# Patient Record
Sex: FEMALE | Race: WHITE | NOT HISPANIC OR LATINO | Employment: OTHER | URBAN - METROPOLITAN AREA
[De-identification: names, ages, dates, MRNs, and addresses within clinical notes are randomized per-mention and may not be internally consistent; named-entity substitution may affect disease eponyms.]

---

## 2018-07-10 ENCOUNTER — OFFICE VISIT CONVERTED (OUTPATIENT)
Dept: FAMILY MEDICINE CLINIC | Facility: CLINIC | Age: 68
End: 2018-07-10
Attending: FAMILY MEDICINE

## 2018-08-13 ENCOUNTER — OFFICE VISIT CONVERTED (OUTPATIENT)
Dept: FAMILY MEDICINE CLINIC | Facility: CLINIC | Age: 68
End: 2018-08-13
Attending: FAMILY MEDICINE

## 2019-02-14 ENCOUNTER — CONVERSION ENCOUNTER (OUTPATIENT)
Dept: FAMILY MEDICINE CLINIC | Facility: CLINIC | Age: 69
End: 2019-02-14

## 2019-02-14 ENCOUNTER — OFFICE VISIT CONVERTED (OUTPATIENT)
Dept: FAMILY MEDICINE CLINIC | Facility: CLINIC | Age: 69
End: 2019-02-14
Attending: FAMILY MEDICINE

## 2019-02-14 ENCOUNTER — HOSPITAL ENCOUNTER (OUTPATIENT)
Dept: FAMILY MEDICINE CLINIC | Facility: CLINIC | Age: 69
Discharge: HOME | End: 2019-02-14
Attending: FAMILY MEDICINE

## 2019-02-18 LAB
CONV RHEUMATOID FACTOR IGA: 2 UNITS (ref 0–6)
CONV RHEUMATOID FACTOR IGG: 2 UNITS (ref 0–6)
CONV RHEUMATOID FACTOR IGM: 1 UNITS (ref 0–6)

## 2019-03-29 ENCOUNTER — OFFICE VISIT CONVERTED (OUTPATIENT)
Dept: FAMILY MEDICINE CLINIC | Facility: CLINIC | Age: 69
End: 2019-03-29
Attending: FAMILY MEDICINE

## 2019-04-08 ENCOUNTER — HOSPITAL ENCOUNTER (OUTPATIENT)
Dept: CARDIOLOGY | Facility: HOSPITAL | Age: 69
Discharge: HOME | End: 2019-04-08
Attending: FAMILY MEDICINE

## 2019-04-12 ENCOUNTER — HOSPITAL ENCOUNTER (OUTPATIENT)
Dept: URGENT CARE | Facility: CLINIC | Age: 69
Discharge: HOME | End: 2019-04-12
Attending: FAMILY MEDICINE

## 2019-08-04 ENCOUNTER — HOSPITAL ENCOUNTER (OUTPATIENT)
Dept: URGENT CARE | Facility: CLINIC | Age: 69
Discharge: HOME | End: 2019-08-04
Attending: FAMILY MEDICINE

## 2019-08-14 ENCOUNTER — HOSPITAL ENCOUNTER (OUTPATIENT)
Dept: URGENT CARE | Facility: CLINIC | Age: 69
Discharge: HOME | End: 2019-08-14
Attending: EMERGENCY MEDICINE

## 2019-10-01 ENCOUNTER — HOSPITAL ENCOUNTER (OUTPATIENT)
Dept: FAMILY MEDICINE CLINIC | Facility: CLINIC | Age: 69
Discharge: HOME | End: 2019-10-01
Attending: FAMILY MEDICINE

## 2019-10-01 ENCOUNTER — OFFICE VISIT CONVERTED (OUTPATIENT)
Dept: FAMILY MEDICINE CLINIC | Facility: CLINIC | Age: 69
End: 2019-10-01
Attending: FAMILY MEDICINE

## 2019-10-01 LAB
ALBUMIN SERPL-MCNC: 4.1 G/DL (ref 3.5–5)
ALBUMIN/GLOB SERPL: 1.6 {RATIO} (ref 1.4–2.6)
ALP SERPL-CCNC: 61 U/L (ref 43–160)
ALT SERPL-CCNC: 12 U/L (ref 10–40)
ANION GAP SERPL CALC-SCNC: 19 MMOL/L (ref 8–19)
AST SERPL-CCNC: 20 U/L (ref 15–50)
BASOPHILS # BLD AUTO: 0.03 10*3/UL (ref 0–0.2)
BASOPHILS NFR BLD AUTO: 0.5 % (ref 0–3)
BILIRUB SERPL-MCNC: 0.44 MG/DL (ref 0.2–1.3)
BUN SERPL-MCNC: 16 MG/DL (ref 5–25)
BUN/CREAT SERPL: 18 {RATIO} (ref 6–20)
CALCIUM SERPL-MCNC: 9.4 MG/DL (ref 8.7–10.4)
CHLORIDE SERPL-SCNC: 102 MMOL/L (ref 99–111)
CONV ABS IMM GRAN: 0.01 10*3/UL (ref 0–0.2)
CONV CO2: 24 MMOL/L (ref 22–32)
CONV IMMATURE GRAN: 0.2 % (ref 0–1.8)
CONV TOTAL PROTEIN: 6.7 G/DL (ref 6.3–8.2)
CREAT UR-MCNC: 0.89 MG/DL (ref 0.5–0.9)
DEPRECATED RDW RBC AUTO: 49.8 FL (ref 36.4–46.3)
EOSINOPHIL # BLD AUTO: 0.08 10*3/UL (ref 0–0.7)
EOSINOPHIL # BLD AUTO: 1.4 % (ref 0–7)
ERYTHROCYTE [DISTWIDTH] IN BLOOD BY AUTOMATED COUNT: 13 % (ref 11.7–14.4)
GFR SERPLBLD BASED ON 1.73 SQ M-ARVRAT: >60 ML/MIN/{1.73_M2}
GLOBULIN UR ELPH-MCNC: 2.6 G/DL (ref 2–3.5)
GLUCOSE SERPL-MCNC: 77 MG/DL (ref 65–99)
HCT VFR BLD AUTO: 37.4 % (ref 37–47)
HGB BLD-MCNC: 11.4 G/DL (ref 12–16)
LYMPHOCYTES # BLD AUTO: 2.25 10*3/UL (ref 1–5)
LYMPHOCYTES NFR BLD AUTO: 39.1 % (ref 20–45)
MCH RBC QN AUTO: 31.8 PG (ref 27–31)
MCHC RBC AUTO-ENTMCNC: 30.5 G/DL (ref 33–37)
MCV RBC AUTO: 104.2 FL (ref 81–99)
MONOCYTES # BLD AUTO: 0.41 10*3/UL (ref 0.2–1.2)
MONOCYTES NFR BLD AUTO: 7.1 % (ref 3–10)
NEUTROPHILS # BLD AUTO: 2.98 10*3/UL (ref 2–8)
NEUTROPHILS NFR BLD AUTO: 51.7 % (ref 30–85)
NRBC CBCN: 0 % (ref 0–0.7)
OSMOLALITY SERPL CALC.SUM OF ELEC: 290 MOSM/KG (ref 273–304)
PLATELET # BLD AUTO: 217 10*3/UL (ref 130–400)
PMV BLD AUTO: 11.7 FL (ref 9.4–12.3)
POTASSIUM SERPL-SCNC: 4.5 MMOL/L (ref 3.5–5.3)
RBC # BLD AUTO: 3.59 10*6/UL (ref 4.2–5.4)
SODIUM SERPL-SCNC: 140 MMOL/L (ref 135–147)
WBC # BLD AUTO: 5.76 10*3/UL (ref 4.8–10.8)

## 2020-01-04 ENCOUNTER — HOSPITAL ENCOUNTER (OUTPATIENT)
Dept: URGENT CARE | Facility: CLINIC | Age: 70
Discharge: HOME | End: 2020-01-04

## 2020-01-06 LAB — BACTERIA UR CULT: NORMAL

## 2020-01-20 ENCOUNTER — HOSPITAL ENCOUNTER (OUTPATIENT)
Dept: URGENT CARE | Facility: CLINIC | Age: 70
Discharge: HOME | End: 2020-01-20

## 2020-01-22 LAB — BACTERIA SPEC AEROBE CULT: NORMAL

## 2020-04-02 ENCOUNTER — TELEMEDICINE CONVERTED (OUTPATIENT)
Dept: FAMILY MEDICINE CLINIC | Facility: CLINIC | Age: 70
End: 2020-04-02
Attending: FAMILY MEDICINE

## 2020-07-02 ENCOUNTER — OFFICE VISIT CONVERTED (OUTPATIENT)
Dept: FAMILY MEDICINE CLINIC | Facility: CLINIC | Age: 70
End: 2020-07-02
Attending: NURSE PRACTITIONER

## 2020-07-02 ENCOUNTER — HOSPITAL ENCOUNTER (OUTPATIENT)
Dept: FAMILY MEDICINE CLINIC | Facility: CLINIC | Age: 70
Discharge: HOME | End: 2020-07-02
Attending: NURSE PRACTITIONER

## 2020-07-02 LAB
ALBUMIN SERPL-MCNC: 3.9 G/DL (ref 3.5–5)
ALBUMIN/GLOB SERPL: 1.6 {RATIO} (ref 1.4–2.6)
ALP SERPL-CCNC: 66 U/L (ref 43–160)
ALT SERPL-CCNC: 16 U/L (ref 10–40)
ANION GAP SERPL CALC-SCNC: 19 MMOL/L (ref 8–19)
AST SERPL-CCNC: 23 U/L (ref 15–50)
BASOPHILS # BLD AUTO: 0.04 10*3/UL (ref 0–0.2)
BASOPHILS NFR BLD AUTO: 0.7 % (ref 0–3)
BILIRUB SERPL-MCNC: 0.58 MG/DL (ref 0.2–1.3)
BUN SERPL-MCNC: 15 MG/DL (ref 5–25)
BUN/CREAT SERPL: 19 {RATIO} (ref 6–20)
CALCIUM SERPL-MCNC: 9.3 MG/DL (ref 8.7–10.4)
CHLORIDE SERPL-SCNC: 102 MMOL/L (ref 99–111)
CONV ABS IMM GRAN: 0.01 10*3/UL (ref 0–0.2)
CONV CO2: 24 MMOL/L (ref 22–32)
CONV IMMATURE GRAN: 0.2 % (ref 0–1.8)
CONV TOTAL PROTEIN: 6.4 G/DL (ref 6.3–8.2)
CREAT UR-MCNC: 0.79 MG/DL (ref 0.5–0.9)
DEPRECATED RDW RBC AUTO: 48.6 FL (ref 36.4–46.3)
EOSINOPHIL # BLD AUTO: 0.08 10*3/UL (ref 0–0.7)
EOSINOPHIL # BLD AUTO: 1.4 % (ref 0–7)
ERYTHROCYTE [DISTWIDTH] IN BLOOD BY AUTOMATED COUNT: 12.7 % (ref 11.7–14.4)
GFR SERPLBLD BASED ON 1.73 SQ M-ARVRAT: >60 ML/MIN/{1.73_M2}
GLOBULIN UR ELPH-MCNC: 2.5 G/DL (ref 2–3.5)
GLUCOSE SERPL-MCNC: 89 MG/DL (ref 65–99)
HCT VFR BLD AUTO: 37.6 % (ref 37–47)
HGB BLD-MCNC: 11.6 G/DL (ref 12–16)
INR PPP: 0.91 (ref 2–3)
LYMPHOCYTES # BLD AUTO: 1.82 10*3/UL (ref 1–5)
LYMPHOCYTES NFR BLD AUTO: 32.3 % (ref 20–45)
MCH RBC QN AUTO: 32 PG (ref 27–31)
MCHC RBC AUTO-ENTMCNC: 30.9 G/DL (ref 33–37)
MCV RBC AUTO: 103.6 FL (ref 81–99)
MONOCYTES # BLD AUTO: 0.39 10*3/UL (ref 0.2–1.2)
MONOCYTES NFR BLD AUTO: 6.9 % (ref 3–10)
NEUTROPHILS # BLD AUTO: 3.3 10*3/UL (ref 2–8)
NEUTROPHILS NFR BLD AUTO: 58.5 % (ref 30–85)
NRBC CBCN: 0 % (ref 0–0.7)
OSMOLALITY SERPL CALC.SUM OF ELEC: 290 MOSM/KG (ref 273–304)
PLATELET # BLD AUTO: 214 10*3/UL (ref 130–400)
PMV BLD AUTO: 11.4 FL (ref 9.4–12.3)
POTASSIUM SERPL-SCNC: 5.1 MMOL/L (ref 3.5–5.3)
PROTHROMBIN TIME: 10 S (ref 9.4–12)
RBC # BLD AUTO: 3.63 10*6/UL (ref 4.2–5.4)
SODIUM SERPL-SCNC: 140 MMOL/L (ref 135–147)
WBC # BLD AUTO: 5.64 10*3/UL (ref 4.8–10.8)

## 2020-08-21 ENCOUNTER — OFFICE VISIT CONVERTED (OUTPATIENT)
Dept: FAMILY MEDICINE CLINIC | Facility: CLINIC | Age: 70
End: 2020-08-21
Attending: FAMILY MEDICINE

## 2020-11-17 ENCOUNTER — HOSPITAL ENCOUNTER (OUTPATIENT)
Dept: MAMMOGRAPHY | Facility: HOSPITAL | Age: 70
Discharge: HOME | End: 2020-11-17
Attending: FAMILY MEDICINE

## 2020-11-24 ENCOUNTER — TELEPHONE CONVERTED (OUTPATIENT)
Dept: FAMILY MEDICINE CLINIC | Facility: CLINIC | Age: 70
End: 2020-11-24
Attending: FAMILY MEDICINE

## 2021-03-09 ENCOUNTER — HOSPITAL ENCOUNTER (OUTPATIENT)
Dept: FAMILY MEDICINE CLINIC | Facility: CLINIC | Age: 71
Discharge: HOME | End: 2021-03-09
Attending: FAMILY MEDICINE

## 2021-03-09 ENCOUNTER — CONVERSION ENCOUNTER (OUTPATIENT)
Dept: FAMILY MEDICINE CLINIC | Facility: CLINIC | Age: 71
End: 2021-03-09

## 2021-03-09 ENCOUNTER — OFFICE VISIT CONVERTED (OUTPATIENT)
Dept: FAMILY MEDICINE CLINIC | Facility: CLINIC | Age: 71
End: 2021-03-09
Attending: FAMILY MEDICINE

## 2021-03-09 LAB
BASOPHILS # BLD AUTO: 0.04 10*3/UL (ref 0–0.2)
BASOPHILS NFR BLD AUTO: 0.7 % (ref 0–3)
CONV ABS IMM GRAN: 0.01 10*3/UL (ref 0–0.2)
CONV IMMATURE GRAN: 0.2 % (ref 0–1.8)
DEPRECATED RDW RBC AUTO: 48.3 FL (ref 36.4–46.3)
EOSINOPHIL # BLD AUTO: 0.07 10*3/UL (ref 0–0.7)
EOSINOPHIL # BLD AUTO: 1.2 % (ref 0–7)
ERYTHROCYTE [DISTWIDTH] IN BLOOD BY AUTOMATED COUNT: 12.8 % (ref 11.7–14.4)
FOLATE SERPL-MCNC: 19.4 NG/ML (ref 4.8–20)
HCT VFR BLD AUTO: 36.5 % (ref 37–47)
HGB BLD-MCNC: 11.6 G/DL (ref 12–16)
IRON SATN MFR SERPL: 15 % (ref 20–55)
IRON SERPL-MCNC: 56 UG/DL (ref 60–170)
LYMPHOCYTES # BLD AUTO: 2.29 10*3/UL (ref 1–5)
LYMPHOCYTES NFR BLD AUTO: 39.7 % (ref 20–45)
MCH RBC QN AUTO: 32.5 PG (ref 27–31)
MCHC RBC AUTO-ENTMCNC: 31.8 G/DL (ref 33–37)
MCV RBC AUTO: 102.2 FL (ref 81–99)
MONOCYTES # BLD AUTO: 0.46 10*3/UL (ref 0.2–1.2)
MONOCYTES NFR BLD AUTO: 8 % (ref 3–10)
NEUTROPHILS # BLD AUTO: 2.9 10*3/UL (ref 2–8)
NEUTROPHILS NFR BLD AUTO: 50.2 % (ref 30–85)
NRBC CBCN: 0 % (ref 0–0.7)
PLATELET # BLD AUTO: 208 10*3/UL (ref 130–400)
PMV BLD AUTO: 11.5 FL (ref 9.4–12.3)
RBC # BLD AUTO: 3.57 10*6/UL (ref 4.2–5.4)
T4 FREE SERPL-MCNC: 1.6 NG/DL (ref 0.9–1.8)
TIBC SERPL-MCNC: 368 UG/DL (ref 245–450)
TRANSFERRIN SERPL-MCNC: 257 MG/DL (ref 250–380)
TSH SERPL-ACNC: 1.19 M[IU]/L (ref 0.27–4.2)
VIT B12 SERPL-MCNC: 468 PG/ML (ref 211–911)
WBC # BLD AUTO: 5.77 10*3/UL (ref 4.8–10.8)

## 2021-03-11 LAB
ARSENIC BLD-MCNC: 7 UG/L (ref 2–23)
LEAD BLD-MCNC: 1 UG/DL (ref 0–4)
MERCURY BLD-MCNC: <1 UG/L (ref 0–14.9)

## 2021-03-16 ENCOUNTER — OFFICE VISIT CONVERTED (OUTPATIENT)
Dept: FAMILY MEDICINE CLINIC | Facility: CLINIC | Age: 71
End: 2021-03-16
Attending: FAMILY MEDICINE

## 2021-03-16 ENCOUNTER — CONVERSION ENCOUNTER (OUTPATIENT)
Dept: OTHER | Facility: HOSPITAL | Age: 71
End: 2021-03-16

## 2021-04-29 ENCOUNTER — HOSPITAL ENCOUNTER (OUTPATIENT)
Dept: FAMILY MEDICINE CLINIC | Facility: CLINIC | Age: 71
Discharge: HOME | End: 2021-04-29
Attending: FAMILY MEDICINE

## 2021-04-29 LAB
ALBUMIN SERPL-MCNC: 3.7 G/DL (ref 3.5–5)
ALBUMIN/GLOB SERPL: 1.4 {RATIO} (ref 1.4–2.6)
ALP SERPL-CCNC: 64 U/L (ref 43–160)
ALT SERPL-CCNC: 18 U/L (ref 10–40)
ANION GAP SERPL CALC-SCNC: 15 MMOL/L (ref 8–19)
AST SERPL-CCNC: 24 U/L (ref 15–50)
BILIRUB SERPL-MCNC: 0.58 MG/DL (ref 0.2–1.3)
BUN SERPL-MCNC: 20 MG/DL (ref 5–25)
BUN/CREAT SERPL: 24 {RATIO} (ref 6–20)
CALCIUM SERPL-MCNC: 9.2 MG/DL (ref 8.7–10.4)
CHLORIDE SERPL-SCNC: 105 MMOL/L (ref 99–111)
CONV CO2: 26 MMOL/L (ref 22–32)
CONV TOTAL PROTEIN: 6.3 G/DL (ref 6.3–8.2)
CREAT UR-MCNC: 0.84 MG/DL (ref 0.5–0.9)
GFR SERPLBLD BASED ON 1.73 SQ M-ARVRAT: >60 ML/MIN/{1.73_M2}
GLOBULIN UR ELPH-MCNC: 2.6 G/DL (ref 2–3.5)
GLUCOSE SERPL-MCNC: 84 MG/DL (ref 65–99)
OSMOLALITY SERPL CALC.SUM OF ELEC: 294 MOSM/KG (ref 273–304)
POTASSIUM SERPL-SCNC: 4.6 MMOL/L (ref 3.5–5.3)
SODIUM SERPL-SCNC: 141 MMOL/L (ref 135–147)

## 2021-05-06 ENCOUNTER — CONVERSION ENCOUNTER (OUTPATIENT)
Dept: FAMILY MEDICINE CLINIC | Facility: CLINIC | Age: 71
End: 2021-05-06

## 2021-05-06 ENCOUNTER — OFFICE VISIT CONVERTED (OUTPATIENT)
Dept: FAMILY MEDICINE CLINIC | Facility: CLINIC | Age: 71
End: 2021-05-06
Attending: FAMILY MEDICINE

## 2021-05-06 ENCOUNTER — HOSPITAL ENCOUNTER (OUTPATIENT)
Dept: FAMILY MEDICINE CLINIC | Facility: CLINIC | Age: 71
Discharge: HOME | End: 2021-05-06
Attending: FAMILY MEDICINE

## 2021-05-06 LAB
BILIRUB UR QL STRIP: NEGATIVE
COLOR UR: YELLOW
CONV CLARITY OF URINE: CLEAR
CONV PROTEIN IN URINE BY AUTOMATED TEST STRIP: NEGATIVE
CONV UROBILINOGEN IN URINE BY AUTOMATED TEST STRIP: NORMAL
FLUAV RNA SPEC QL NAA+PROBE: NEGATIVE
FLUBV RNA SPEC QL NAA+PROBE: NEGATIVE
GLUCOSE UR QL: NEGATIVE
HGB UR QL STRIP: NORMAL
KETONES UR QL STRIP: NEGATIVE
LEUKOCYTE ESTERASE UR QL STRIP: NORMAL
NITRITE UR QL STRIP: POSITIVE
PH UR STRIP.AUTO: 5.5 [PH]
SP GR UR: NORMAL

## 2021-05-09 LAB
AMPICILLIN SUSC ISLT: >=32
AMPICILLIN+SULBAC SUSC ISLT: <=2
BACTERIA UR CULT: ABNORMAL
CEFAZOLIN SUSC ISLT: <=4
CEFEPIME SUSC ISLT: <=0.12
CEFTAZIDIME SUSC ISLT: <=1
CEFTRIAXONE SUSC ISLT: <=0.25
CIPROFLOXACIN SUSC ISLT: <=0.25
ERTAPENEM SUSC ISLT: <=0.12
GENTAMICIN SUSC ISLT: <=1
LEVOFLOXACIN SUSC ISLT: <=0.12
NITROFURANTOIN SUSC ISLT: <=16
PIP+TAZO SUSC ISLT: <=4
TMP SMX SUSC ISLT: <=20
TOBRAMYCIN SUSC ISLT: <=1

## 2021-05-12 ENCOUNTER — HOSPITAL ENCOUNTER (OUTPATIENT)
Dept: URGENT CARE | Facility: CLINIC | Age: 71
Discharge: HOME | End: 2021-05-12
Attending: EMERGENCY MEDICINE

## 2021-05-12 NOTE — PROGRESS NOTES
Quick Note      Patient Name: Suzette Wilson   Patient ID: [id number]   Sex: Female   YOB: 1950    Primary Care Provider: Isis Ordonez MD    Visit Date: April 2, 2020    Provider: Isis Ordonez MD   Location: [facility]   Location Address: [address] [place] [address], [address]  [id number]   Location Phone: [phone]          History Of Present Illness  TELEHEALTH VISIT  Chief Complaint: 6 Month follow up   Suzette Wilson is a 69 year old /White female who is presenting for evaluation via telehealth visit. Verbal consent obtained before beginning visit.   Provider spent 25 minutes minutes with the patient during telehealth visit.   The following staff were present during this visit: Prisca Gomez   Past Medical History/Overview of Patient Symptoms     Pt reports her right hip is really bothering her especially going up and down stairs.  Pt is taking meloxicam 7.5mg once a day so we will increase to 15 mg.      HTN- pt is not able To check her blood pressure while she is at home due to not having a blood pressure cuff so I will be sending a prescription for a blood pressure cuff to her pharmacy so that she can check it and record her readings for me.  Patient reports that she is compliant with her medications.           Assessment  · Essential hypertension     401.9/I10  · Osteoarthritis     715.90/M19.90      Plan  · Medications  o meloxicam 15 mg oral tablet   SIG: take 1 tablet (15 mg) by oral route once daily for 90 days   DISP: (90) tablets with 1 refills  Adjusted on 04/02/2020     o AcipHex 20 mg oral tablet,delayed release (DR/EC)   SIG: take 1 tablet by oral route daily for 90 days   DISP: (90) tablets with 1 refills  Refilled on 04/02/2020     o lisinopril 20 mg oral tablet   SIG: take 1 tablet (20 mg) by oral route once daily for 90 days   DISP: (90) tablet with 1 refills  Refilled on 04/02/2020     o Toprol XL 50 mg oral tablet extended release 24 hr   SIG:  take 1 tablet (50 mg) by oral route once daily for 90 days   DISP: (90) tablet with 1 refills  Refilled on 04/02/2020     o Medications have been Reconciled  o Transition of Care or Provider Policy  · Instructions  o Plan Of Care:   · Disposition  o Return Visit Request in/on 6 months +/- 0 days ([id number]).            Electronically Signed by: Isis Ordonez MD -Author on April 2, 2020 09:51:15 AM

## 2021-05-13 NOTE — PROGRESS NOTES
Progress Note      Patient Name: Suzette Wilson   Patient ID: [id number]   Sex: Female   YOB: 1950    Primary Care Provider: Isis Ordonez MD    Visit Date: August 21, 2020    Provider: Isis Ordonez MD   Location: [facility]   Location Address: [address] [place] [address], [address]  [id number]   Location Phone: [phone]          Chief Complaint  · Annual Wellness Exam      History Of Present Illness  The patient is a 69 year old /White female who has come to this office for her Annual Wellness Visit.   Her Primary Care Provider is Isis Ordonez MD. Her comprehensive Care Team list, including suppliers, has been updated on the Facesheet. Her medical/family history, height, weight, BMI, and blood pressure have been reviewed and are in the chart. The Health Risk Assessment has been completed and scanned in the chart.   Medications are listed in the medication list.   The active problem list includes: Hemorrhoids, Hypertension, Left Knee Osteoarthritis , and Reflux   The patient does not have a history of substance use.   Patient reports her diet is adequate.   The Mini-Cog has been administered and is scanned in chart. The results are negative. Her cognitive function is without limitation.   A hearing loss screen was completed today and the result is negative.   Patient does not have any risk factors for depression. Patient completed the PHQ-9 today and it has been scanned in the chart. The total score is PHQ-9 TOTAL SCORE 0.   The Timed Up and Go screen was administered today and the result is negative.   The Waldrop Index of [place] in ADLs indicated full function (score of 6).   A Falls Risk Assessment has been completed, including a review of home fall hazards and medication review.   Overall, the patient's functional ability is noted by this provider to be within normal limits. Her level of safety is noted to be within normal limits. Her balance/gait is within  normal limits. There have been no falls in the past year. Patient-specific home safety recommendations have been reviewed and a copy has been given to patient.   She denies issues with leaking urine.   There are no additional risk factors identified.   Living Will/Advanced Directive previously executed but not in chart.   Personalized health advice was given to the patient and a written health screening schedule was established; see Plan for details.   Suzette Wilson is a 69 year old /White female who presents for evaluation and treatment of:       Past Medical History  Hemorrhoids; Hypertension; Left Knee Osteoarthritis ; Reflux; Right Knee Medial Meniscus Tear         Past Surgical History  Colonoscopy; Cystocele; Deep plane facelift; EGD; Rectocele; Tonsillectomy         Medication List  AcipHex 20 mg oral tablet,delayed release (DR/EC); biotin 5 mg oral tablet; Blood Pressure Cuff miscellaneous misc; Flonase Allergy Relief 50 mcg/actuation nasal spray,suspension; lisinopril 20 mg oral tablet; magnesium 250 mg oral tablet; meloxicam 15 mg oral tablet; naproxen 500 mg oral tablet; Premarin 0.625 mg/gram vaginal cream; Toprol XL 50 mg oral tablet extended release 24 hr         Allergy List  Percocet         Family Medical History  Stroke; Alzheimer's Disease; - No Family History of Colorectal Cancer         Reproductive History   3 Para 3 0 0 0       Social History  Alcohol (Current some day); Claustrophobic?; lives with spouse; .; Recreational Drug Use (Never); Retired.; Tobacco (Never)         Review of Systems  · Constitutional  o Denies  o : fatigue, fever, weight loss, weight gain  · Eyes  o Denies  o : eye discomfort, eye pain  · HENT  o Denies  o : vertigo, nasal congestion  · Respiratory  o Denies  o : shortness of breath, wheezing  · Gastrointestinal  o Denies  o : nausea, vomiting  · Genitourinary  o Denies  o : frequency, dysuria  · Integument  o Denies  o : rash,  "itching  · Neurologic  o Denies  o : muscular weakness, memory difficulties  · Musculoskeletal  o Denies  o : joint swelling, muscle pain  · Psychiatric  o Denies  o : anxiety, depression  · Heme-Lymph  o Denies  o : lightheadedness, easy bleeding  · Allergic-Immunologic  o Denies  o : sinus allergy symptoms, allergic dermatitis      Vitals  Date Time BP Position Site L\R Cuff Size HR RR TEMP (F) WT  HT  BMI kg/m2 BSA m2 O2 Sat HC       08/21/2020 02:40 /78 Sitting    67 - R 14 99.5 191lbs 0oz 5'  1\" 36.09 1.93 98 %                  Assessment  · Encounter for Medicare annual wellness exam     V70.0/Z00.00  · Screening for depression     V79.0/Z13.89  · Screening for alcoholism     V79.1/Z13.39  · Visit for screening mammogram     V76.12/Z12.31      Plan  · Orders  o Falls Risk Assessment Completed (3288F) - V70.0/Z00.00 - 08/21/2020  o Brief hearing screening (written) [facility] () - V70.0/Z00.00 - 08/21/2020  o Annual Wellness Visit-includes a Personalized Prevention Plan of Service (PPS), SUBSEQUENT VISIT (Medicare) () - V70.0/Z00.00 - 08/25/2020  o ACO-13: Fall Risk Screening with no falls in past year or only one fall without injury in the past year (1101F) - V70.0/Z00.00 - 08/25/2020  o Presence or absence of urinary incontinence assessed (GARRY) (1090F) - V70.0/Z00.00 - 08/21/2020  o Negative alcohol screening () - V79.1/Z13.39 - 08/25/2020  o Screening Mammography; Bilateral 3D (55516, 55125, ) - V76.12/Z12.31 - 08/21/2020  o ACO-39: Current medications updated and reviewed () - - 08/21/2020  o ACO-18: Negative screen for clinical depression using a standardized tool () - - 08/25/2020  o ACO-13: Fall Risk Screening with no falls in past year or only one fall without injury in the past year (1101F) - - 08/25/2020  · Medications  o lisinopril 20 mg oral tablet   SIG: take 1 tablet (20 mg) by oral route once daily for 90 days   DISP: (90) tablet with 1 refills  Refilled on 08/21/2020 "     o meloxicam 15 mg oral tablet   SIG: take 1 tablet (15 mg) by oral route once daily for 90 days   DISP: (90) tablets with 1 refills  Refilled on 08/21/2020     o Toprol XL 50 mg oral tablet extended release 24 hr   SIG: take 1 tablet (50 mg) by oral route once daily for 90 days   DISP: (90) tablet with 1 refills  Refilled on 08/21/2020     o Medications have been Reconciled  o Transition of Care or Provider Policy  · Instructions  o Health Risk Assessment has been reviewed with the patient.  o Written health screening schedule for next 5-10 years was established with patient; information scanned in chart and given/mailed to patient.  o Fall prevention methods discussed and a copy of recommendations given/mailed to patient.  o Today's PHQ-9 score is: _1__  o Depression Screen completed and scanned into the EMR under the designated folder within the patient's documents.  o Patient was educated/instructed on their diagnosis, treatment and medications prior to discharge from the clinic today.  o Minutes spent with patient including greater than 50% in Education/Counseling/Care Coordination.  o Time spent with the patient was minutes, more than 50% face to face. 25 minutes  · Disposition  o Return Visit Request in/on 6 months +/- 0 days (05981).            Electronically Signed by: Isis Ordonez MD -Author on August 28, 2020 04:33:04 PM

## 2021-05-13 NOTE — PROGRESS NOTES
"   Progress Note      Patient Name: Suztete Wilson   Patient ID: [id number]   Sex: Female   YOB: 1950    Primary Care Provider: Isis Ordonez MD    Visit Date: 2020    Provider: Isis Ordonez MD   Location: [facility]   Location Address: [address] [place] [address], [address]  [id number]   Location Phone: [phone]          Chief Complaint  · Skin Lesion on left Cheek      History Of Present Illness  TELEHEALTH TELEPHONE VISIT  Suzette Wilson is a 70 year old /White female who is presenting for evaluation via telehealth telephone visit. Verbal consent obtained before beginning visit.   Provider spent 25 minutes with patient during telehealth visit.   The following staff were present during this visit: Prisca Gomez   Past Medical History/Overview of Patient Symptoms     Pt reports the skin lesion on her left cheek is getting larger and now has more of them and she has a wart on the right side of her nose and the other \"moles\" are on her.    Pt has a black mole in her groin area. Pt would like to go to a Female Dermatologist.       Past Medical History  Hemorrhoids; Hypertension; Left Knee Osteoarthritis ; Reflux; Right Knee Medial Meniscus Tear         Past Surgical History  Colonoscopy; Cystocele; Deep plane facelift; EGD; Rectocele; Tonsillectomy         Medication List  AcipHex 20 mg oral tablet,delayed release (DR/EC); biotin 5 mg oral tablet; Blood Pressure Cuff miscellaneous misc; Flonase Allergy Relief 50 mcg/actuation nasal spray,suspension; lisinopril 20 mg oral tablet; magnesium 250 mg oral tablet; meloxicam 15 mg oral tablet; naproxen 500 mg oral tablet; Premarin 0.625 mg/gram vaginal cream; Toprol XL 50 mg oral tablet extended release 24 hr         Allergy List  Percocet         Family Medical History  Stroke; Alzheimer's Disease; - No Family History of Colorectal Cancer         Reproductive History   3 Para 3 0 0 0 "       Social History  Alcohol (Current some day); Claustrophobic?; lives with spouse; .; Recreational Drug Use (Never); Retired.; Tobacco (Never)         Review of Systems  · Constitutional  o Denies  o : fatigue, fever, weight loss, weight gain  · Eyes  o Denies  o : eye discomfort, eye pain  · HENT  o Denies  o : vertigo, nasal congestion  · Genitourinary  o Denies  o : frequency, dysuria  · Integument  o Admits  o : new skin lesions, changes to existing skin lesions or moles  o Denies  o : rash, itching  · Neurologic  o Denies  o : muscular weakness, memory difficulties  · Musculoskeletal  o Denies  o : joint swelling, muscle pain  · Psychiatric  o Denies  o : anxiety, depression  · Heme-Lymph  o Denies  o : lightheadedness, easy bleeding  · Allergic-Immunologic  o Denies  o : sinus allergy symptoms, allergic dermatitis          Assessment  · Skin lesion of face     709.9/L98.9  · Skin lesion     709.9/L98.9      Plan  · Orders  o ACO-39: Current medications updated and reviewed (, 1159F) - - 11/24/2020  o Physician Telephone Evaluation, 21-30 minutes (24836) - 709.9/L98.9 - 11/24/2020  o DERMATOLOGY CONSULTATION (DERMA) - 709.9/L98.9 - 11/24/2020   on face and in groin. Pt will call back with name of female Dermatologist she wants to go to.  · Medications  o Medications have been Reconciled  o Transition of Care or Provider Policy  · Instructions  o Plan Of Care:   o Chronic conditions reviewed and taken into consideration for today's treatment plan.  · Disposition  o Call or Return if symptoms worsen or persist.            Electronically Signed by: Isis Ordonez MD -Author on November 24, 2020 11:37:04 AM

## 2021-05-13 NOTE — PROGRESS NOTES
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  She states that it waxes and wanes in intensity.  She states it occurs all times of the day.  She states it is always on her right nostril side.  She states that her living Jevity at the [facility] can last couple minutes to 30 minutes to an hour.  She states she has had periods where she is spitting out blood clots.  She does take a daily aspirin, she is not currently on any other blood thinners.  She states she does have seasonal allergies but not severe enough or she needs to take medication on a daily basis.  Has never had any polyps within the nasal cavities or any ENT issues previously.       Past Medical History  Disease Name Date Onset Notes   Hemorrhoids --  --    Hypertension --  --    Left Knee Osteoarthritis  08/24/2016 --    Reflux --  --    Right Knee Medial Meniscus Tear 08/11/2016 --          Past Surgical History  Procedure Name Date Notes   Colonoscopy 2007 2017 --    Cystocele --  --    Deep plane facelift --  --    EGD 2017 --    Rectocele --  --    Tonsillectomy 1968 --          Medication List  Name Date Started Instructions   AcipHex 20 mg oral tablet,delayed release (/EC) 04/07/2020 take 1 tablet by oral route daily for 90 days   aspirin 81 mg oral tablet,delayed release (DR/EC) 04/07/2020 take 1 tablet (81 mg) by oral route once daily  for 90 days   biotin 5 mg oral tablet  take 1 tablet by oral route daily   lisinopril 20 mg oral tablet 2020 take 1 tablet (20 mg) by oral route once daily for 90 days   magnesium 250 mg oral tablet  take 1 tablet by oral route once a day (at bedtime)   melatonin 5 mg oral tablet 2019 take 1 tablet by oral route once a day (at bedtime) for 90 days   meloxicam 15 mg oral tablet 2020 take 1 tablet (15 mg) by oral route once daily for 90 days   naproxen 500 mg oral tablet  take 1 tablet (500 mg) by oral route 2 times per day with food   Premarin 0.625 mg/gram vaginal cream 2020 insert 0.5 gram by vaginal route twice weekly for 30 days   Toprol XL 50 mg oral tablet extended release 24 hr 2020 take 1 tablet (50 mg) by oral route once daily for 90 days         Allergy List  Allergen Name Date Reaction Notes   Percocet --  --  --          Family Medical History  Disease Name Relative/Age Notes   Stroke Father/  Mother/   Mother; Father   Alzheimer's Disease Brother/or  Sister/or   --    - No Family History of Colorectal Cancer  --          Reproductive History  Menstrual   Age Menopause: 50   Pregnancy Summary   Total Pregnancies: 3 Full Term: 3 Premature: 0   Ab Induced: 0 Ab Spontaneous: 0 Ectopics: 0   Multiples: 0 Livin         Social History  Finding Status Start/Stop Quantity Notes   Alcohol Current some day --/-- --  --    Claustrophobic? --  --/-- --  no   lives with spouse --  --/-- --  --    . --  --/-- --  --    Recreational Drug Use Never --/-- --  no   Retired. --  --/-- --  --    Tobacco Never --/-- --  never smoker         Review of Systems  · Constitutional  o Denies  o : fever, fatigue, weight loss, weight gain  · HENT  o Admits  o : Reoccurring nosebleed  o Denies  o : headaches, vertigo, lightheadedness  · Cardiovascular  o Denies  o : lower extremity edema, claudication, chest pressure, palpitations  · Respiratory  o Denies  o : shortness of breath, wheezing,  "cough, hemoptysis, dyspnea on exertion  · Gastrointestinal  o Denies  o : nausea, vomiting, diarrhea, constipation, abdominal pain      Vitals  Date Time BP Position Site L\R Cuff Size HR RR TEMP (F) WT  HT  BMI kg/m2 BSA m2 O2 Sat        07/02/2020 08:39 /76 Sitting    55 - R  97.8 188lbs 0oz 5'  1\" 35.52 1.92 98 %          Physical Examination  · Constitutional  o Appearance  o : well-nourished, well developed, in no acute distress  · Eyes  o Conjunctivae  o : conjunctivae normal, no exudates present  o Sclerae  o : sclerae white  o Pupils and Irises  o : pupils equal and round, and reactive to light and accomodation bilaterally  o Eyelids/Ocular Adnexae  o : extra ocular movements intact  · Ears, Nose, Mouth and Throat  o Ears  o :   § External Ears  § : external auditory canal appearance within normal limits, no discharge present  § Otoscopic Examination  § : tympanic membrane appearance within normal limits bilaterally  o Nose  o :   § External Nose  § : appearance normal  § Nasopharynx  § : no discharge present  · Respiratory  o Respiratory Effort  o : breathing unlabored, no accessory muscle use  o Inspection of Chest  o : normal appearance, no retractions  o Auscultation of Lungs  o : normal breath sounds bilaterally  · Cardiovascular  o Heart  o :   § Auscultation of Heart  § : regular rate and rhythm, no murmurs, gallops or rubs  o Peripheral Vascular System  o :   § Extremities  § : no edema              Assessment  · Nosebleed     784.7/R04.0  Reoccurring nosebleeds. Will check labs, start on Flonase, Damon-Synephrine spray as needed if patient has issues getting her nosebleeds to stop. She has stopped her baby aspirin and states yesterday was the first day she had not had any nosebleeds so discussed with patient to discontinue baby aspirin at this time until we get labs back and follow-up. Discussed return precautions. Patient verbalized understanding is agreeable treatment plan.    Problems " Reconciled  Plan  · Orders  o CBC with Auto Diff [facility] ([id number]) - 784.7/R04.0 - 07/02/2020  o CMP [facility] ([id number]) - 784.7/R04.0 - 07/02/2020  o ACO-39: Current medications updated and reviewed () - - 07/02/2020  o PT/INR (59954) - 784.7/R04.0 - 07/02/2020  · Medications  o Flonase Allergy Relief 50 mcg/actuation nasal spray,suspension   SIG: inhale 2 sprays (100 mcg) in each nostril by intranasal route once daily for 30 days   DISP: (1) 9.9 ml aer w/adap with 5 refills  Prescribed on 07/02/2020     o Blood Pressure Cuff miscellaneous misc   SIG: use as directed for 999 days   DISP: (1) Box with 0 refills  Prescribed on 07/02/2020     o Damon-Synephrine (phenylephrine) 0.5 % nasal spray,non-aerosol   SIG: inhale 2 sprays by nasal route daily as needed for 30 days nose bleed   DISP: (1) 15 ml squeez btl with 0 refills  Prescribed on 07/02/2020     o Medications have been Reconciled  o Transition of Care or Provider Policy  · Instructions  o Take all medications as prescribed/directed.  o Patient was educated/instructed on their diagnosis, treatment and medications prior to discharge from the clinic today.  o Call the office with any concerns or questions.  · Disposition  o Call or Return if symptoms worsen or persist.  o follow up as needed  o call the office with any questions or concerns            Electronically Signed by: Enoch Duncan APRN -Author on July 2, 2020 09:08:50 AM

## 2021-05-14 VITALS
DIASTOLIC BLOOD PRESSURE: 70 MMHG | TEMPERATURE: 97.7 F | BODY MASS INDEX: 35.12 KG/M2 | HEIGHT: 61 IN | HEART RATE: 78 BPM | SYSTOLIC BLOOD PRESSURE: 118 MMHG | WEIGHT: 186 LBS | OXYGEN SATURATION: 98 %

## 2021-05-14 VITALS
TEMPERATURE: 97.8 F | HEIGHT: 61 IN | OXYGEN SATURATION: 99 % | DIASTOLIC BLOOD PRESSURE: 78 MMHG | HEART RATE: 57 BPM | BODY MASS INDEX: 34.55 KG/M2 | RESPIRATION RATE: 14 BRPM | WEIGHT: 183 LBS | SYSTOLIC BLOOD PRESSURE: 114 MMHG

## 2021-05-14 VITALS
RESPIRATION RATE: 14 BRPM | HEART RATE: 67 BPM | HEIGHT: 61 IN | DIASTOLIC BLOOD PRESSURE: 78 MMHG | WEIGHT: 191 LBS | OXYGEN SATURATION: 98 % | TEMPERATURE: 99.5 F | SYSTOLIC BLOOD PRESSURE: 120 MMHG | BODY MASS INDEX: 36.06 KG/M2

## 2021-05-14 NOTE — PROGRESS NOTES
Progress Note      Patient Name: Suzette Wilson   Patient ID: [id number]   Sex: Female   YOB: 1950    Primary Care Provider: Isis Ordonez MD   Referring Provider: Isis Ordonez MD    Visit Date: 2021    Provider: Isis Ordonez MD   Location: [facility]   Location Address: [address] [place] [address], [address] [place] [address]  [id number]   Location Phone: [phone]          Chief Complaint  · Memory Problems      History Of Present Illness  Suzette Wilson is a 70 year old /White female who presents for evaluation and treatment of:      Pt was given the Mini-Mental Status Examination and did very well scoring a 29 out of 30.  I explained that she still has a very high level of memory even at 70 and every year we can continue to check it by repeating the test to see if she starts to score lower.  Pt seems reassured by our conversation that she may be more forgetful but not truly experiencing any symptoms of Dementia.  Total time for our visit including time for test was 45 minutes.  Test has been scanned into patient chart as part of this visit.       Past Medical History  Hemorrhoids; Hypertension; Left Knee Osteoarthritis ; Reflux; Right Knee Medial Meniscus Tear         Past Surgical History  Colonoscopy; Cystocele; Deep plane facelift; EGD; Rectocele; Tonsillectomy         Medication List  AcipHex 20 mg oral tablet,delayed release (DR/EC); biotin 5 mg oral tablet; Blood Pressure Cuff miscellaneous misc; Flonase Allergy Relief 50 mcg/actuation nasal spray,suspension; lisinopril 20 mg oral tablet; magnesium 250 mg oral tablet; meloxicam 15 mg oral tablet; naproxen 500 mg oral tablet; Premarin 0.625 mg/gram vaginal cream; Toprol XL 50 mg oral tablet extended release 24 hr         Allergy List  Percocet       Allergies Reconciled  Family Medical History  Stroke; Alzheimer's Disease; - No Family History of Colorectal Cancer         Reproductive History    "3 Para 3 0 0 0       Social History  Alcohol (Current some day); Claustrophobic?; lives with spouse; .; Recreational Drug Use (Never); Retired.; Tobacco (Never)         Review of Systems  · Constitutional  o Denies  o : fatigue, fever, weight loss, weight gain  · Eyes  o Denies  o : eye discomfort, eye pain  · HENT  o Denies  o : vertigo, nasal congestion  · Cardiovascular  o Denies  o : chest pain, irregular heart beats  · Respiratory  o Denies  o : shortness of breath, wheezing  · Gastrointestinal  o Denies  o : nausea, vomiting  · Genitourinary  o Denies  o : frequency, dysuria  · Integument  o Denies  o : rash, itching  · Neurologic  o Admits  o : memory difficulties  o Denies  o : muscular weakness  · Musculoskeletal  o Denies  o : joint swelling, muscle pain  · Psychiatric  o Denies  o : anxiety, depression  · Heme-Lymph  o Denies  o : lightheadedness, easy bleeding  · Allergic-Immunologic  o Denies  o : sinus allergy symptoms, allergic dermatitis      Vitals  Date Time BP Position Site L\R Cuff Size HR RR TEMP (F) WT  HT  BMI kg/m2 BSA m2 O2 Sat FR L/min FiO2 HC       03/16/2021 04:00 /70 Sitting    78 - R  97.7 185lbs 16oz 5'  1\" 35.14 1.91 98 %            Physical Examination  · Constitutional  o Appearance  o : well-nourished, in no acute distress  · Eyes  o Conjunctivae  o : conjunctivae normal  o Sclerae  o : sclerae white  o Pupils and Irises  o : pupils equal, round, and reactive to light and accommodation bilaterally  o Eyelids/Ocular Adnexae  o : eyelid appearance normal, no exudates present  · Ears, Nose, Mouth and Throat  o Ears  o :   § External Ears  § : external auditory canal appearance within normal limits, no discharge present  § Otoscopic Examination  § : tympanic membrane appearance within normal limits bilaterally  o Nose  o :   § External Nose  § : appearance normal  § Nasopharynx  § : no discharge present  o Oral Cavity  o :   § Oral Mucosa  § : oral mucosa " normal  § Lips  § : lip appearance normal  o Throat  o :   § Oropharynx  § : no inflammation or lesions present, tonsils within normal limits  · Neck  o Inspection/Palpation  o : normal appearance, no masses or tenderness, trachea midline  o Range of Motion  o : cervical range of motion within normal limits  o Thyroid  o : gland size normal, nontender, no nodules or masses present on palpation  o Jugular Veins  o : JVP normal  · Respiratory  o Respiratory Effort  o : breathing unlabored  o Inspection of Chest  o : normal appearance  o Auscultation of Lungs  o : normal breath sounds throughout  · Cardiovascular  o Heart  o :   § Auscultation of Heart  § : regular rate and rhythm, no murmurs, gallops or rubs  o Peripheral Vascular System  o :   § Extremities  § : no edema  · Gastrointestinal  o Abdominal Examination  o : abdomen nontender to palpation, tone normal without rigidity or guarding, no masses present, bowel sounds present in all four quadrants  o Liver and spleen  o : no hepatomegaly present, liver nontender to palpation, spleen not palpable  o Hernias  o : no hernias present  · Lymphatic  o Neck  o : no lymphadenopathy present  · Musculoskeletal  o Spine  o :   § Inspection/Palpation  § : no spinal tenderness, scoliosis or kyphosis present  § Stability  § : no subluxations present  § Range of Motion  § : spine range of motion normal  o Right Upper Extremity  o :   § Inspection/Palpation  § : no tenderness to palpation, ROM normal  o Left Upper Extremity  o :   § Inspection/Palpation  § : no tenderness to palpation, ROM normal  o Right Lower Extremity  o :   § Inspection/Palpation  § : no joint or limb tenderness to palpation, ROM normal  o Left Lower Extremity  o :   § Inspection/Palpation  § : no joint or limb tenderness to palpation, ROM normal  · Skin and Subcutaneous Tissue  o General Inspection  o : no rashes or lesions present, no areas of discoloration  o Body Hair  o : scalp palpation normal, hair  normal for age, general body hair distribution normal for age  o Digits and Nails  o : no clubbing, cyanosis, deformities or edema present, normal appearing nails  · Neurologic  o Mental Status Examination  o :   § Orientation  § : grossly oriented to person, place and time  o Gait and Station  o : normal gait, able to stand without difficulty  · Psychiatric  o Judgement and Insight  o : judgment and insight intact  o Mood and Affect  o : mood normal, affect appropriate          Assessment  · Forgetfulness     780.99/R68.89      Plan  · Orders  o ACO-39: Current medications updated and reviewed (1159F, ) - - 03/24/2021  · Medications  o Medications have been Reconciled  o Transition of Care or Provider Policy  · Instructions  o Patient was educated/instructed on their diagnosis, treatment and medications prior to discharge from the clinic today.  o Minutes spent with patient including greater than 50% in Education/Counseling/Care Coordination.  o Time spent with the patient was minutes, more than 50% face to face. 45 minutes  · Disposition  o Call or Return if symptoms worsen or persist.            Electronically Signed by: Isis Ordonez MD -Author on March 24, 2021 05:59:04 PM

## 2021-05-14 NOTE — PROGRESS NOTES
Progress Note      Patient Name: Suzette Wilson   Patient ID: [id number]   Sex: Female   YOB: 1950    Primary Care Provider: Isis Ordonez MD   Referring Provider: Isis Ordonez MD    Visit Date: 2021    Provider: Isis Ordonez MD   Location: [facility]   Location Address: [address] [place] [address], [address] [place] [address]  [id number]   Location Phone: [phone]          Chief Complaint  · 6 Month followup      History Of Present Illness  Suzette Wilson is a 70 year old /White female who presents for evaluation and treatment of:      Pt reports she has been getting confused and sometimes she forgets where she is going.  Pt did graduate highschool and a year of secondary training. Pt has a family history of of dad Dementia and sister  of Alzheimers.  Pt is concerned that there may be a genetic predisposition.  Patient will be returning in 1 week to do a memory test and also be getting labs drawn today to check for thyroid vitamin B12 and heavy metals.       Past Medical History  Hemorrhoids; Hypertension; Left Knee Osteoarthritis ; Reflux; Right Knee Medial Meniscus Tear         Past Surgical History  Colonoscopy; Cystocele; Deep plane facelift; EGD; Rectocele; Tonsillectomy         Medication List  AcipHex 20 mg oral tablet,delayed release (DR/EC); biotin 5 mg oral tablet; Blood Pressure Cuff miscellaneous misc; Flonase Allergy Relief 50 mcg/actuation nasal spray,suspension; lisinopril 20 mg oral tablet; magnesium 250 mg oral tablet; meloxicam 15 mg oral tablet; naproxen 500 mg oral tablet; Premarin 0.625 mg/gram vaginal cream; Toprol XL 50 mg oral tablet extended release 24 hr         Allergy List  Percocet       Allergies Reconciled  Family Medical History  Stroke; Alzheimer's Disease; - No Family History of Colorectal Cancer         Reproductive History   3 Para 3 0 0 0       Social History  Alcohol (Current some day); Claustrophobic?; lives  "with spouse; .; Recreational Drug Use (Never); Retired.; Tobacco (Never)         Review of Systems  · Constitutional  o Denies  o : fatigue, fever, weight loss, weight gain  · Eyes  o Denies  o : eye discomfort, eye pain  · HENT  o Denies  o : vertigo, nasal congestion  · Cardiovascular  o Denies  o : chest pain, irregular heart beats  · Respiratory  o Denies  o : shortness of breath, wheezing  · Gastrointestinal  o Denies  o : nausea, vomiting  · Genitourinary  o Denies  o : frequency, dysuria  · Integument  o Denies  o : rash, itching  · Neurologic  o Admits  o : memory difficulties  o Denies  o : muscular weakness  · Musculoskeletal  o Denies  o : joint swelling, muscle pain  · Psychiatric  o Denies  o : anxiety, depression  · Heme-Lymph  o Denies  o : lightheadedness, easy bleeding  · Allergic-Immunologic  o Denies  o : sinus allergy symptoms, allergic dermatitis      Vitals  Date Time BP Position Site L\R Cuff Size HR RR TEMP (F) WT  HT  BMI kg/m2 BSA m2 O2 Sat FR L/min FiO2        03/09/2021 09:10 /78 Sitting    57 - R 14 97.8 183lbs 0oz 5'  1\" 34.58 1.89 99 %            Physical Examination  · Constitutional  o Appearance  o : well-nourished, in no acute distress  · Eyes  o Conjunctivae  o : conjunctivae normal  o Sclerae  o : sclerae white  o Pupils and Irises  o : pupils equal, round, and reactive to light and accommodation bilaterally  o Eyelids/Ocular Adnexae  o : eyelid appearance normal, no exudates present  · Ears, Nose, Mouth and Throat  o Ears  o :   § External Ears  § : external auditory canal appearance within normal limits, no discharge present  § Otoscopic Examination  § : tympanic membrane appearance within normal limits bilaterally  o Nose  o :   § External Nose  § : appearance normal  § Nasopharynx  § : no discharge present  o Oral Cavity  o :   § Oral Mucosa  § : oral mucosa normal  § Lips  § : lip appearance normal  o Throat  o :   § Oropharynx  § : no inflammation or lesions " present, tonsils within normal limits  · Neck  o Inspection/Palpation  o : normal appearance, no masses or tenderness, trachea midline  o Range of Motion  o : cervical range of motion within normal limits  o Thyroid  o : gland size normal, nontender, no nodules or masses present on palpation  o Jugular Veins  o : JVP normal  · Respiratory  o Respiratory Effort  o : breathing unlabored  o Inspection of Chest  o : normal appearance  o Auscultation of Lungs  o : normal breath sounds throughout  · Cardiovascular  o Heart  o :   § Auscultation of Heart  § : regular rate and rhythm, no murmurs, gallops or rubs  o Peripheral Vascular System  o :   § Extremities  § : no edema  · Gastrointestinal  o Abdominal Examination  o : abdomen nontender to palpation, tone normal without rigidity or guarding, no masses present, bowel sounds present in all four quadrants  o Liver and spleen  o : no hepatomegaly present, liver nontender to palpation, spleen not palpable  o Hernias  o : no hernias present  · Lymphatic  o Neck  o : no lymphadenopathy present  · Musculoskeletal  o Spine  o :   § Inspection/Palpation  § : no spinal tenderness, scoliosis or kyphosis present  § Stability  § : no subluxations present  § Range of Motion  § : spine range of motion normal  o Right Upper Extremity  o :   § Inspection/Palpation  § : no tenderness to palpation, ROM normal  o Left Upper Extremity  o :   § Inspection/Palpation  § : no tenderness to palpation, ROM normal  o Right Lower Extremity  o :   § Inspection/Palpation  § : no joint or limb tenderness to palpation, ROM normal  o Left Lower Extremity  o :   § Inspection/Palpation  § : no joint or limb tenderness to palpation, ROM normal  · Skin and Subcutaneous Tissue  o General Inspection  o : no rashes or lesions present, no areas of discoloration  o Body Hair  o : scalp palpation normal, hair normal for age, general body hair distribution normal for age  o Digits and Nails  o : no clubbing,  cyanosis, deformities or edema present, normal appearing nails  · Neurologic  o Mental Status Examination  o :   § Orientation  § : grossly oriented to person, place and time  o Gait and Station  o : normal gait, able to stand without difficulty  · Psychiatric  o Judgement and Insight  o : judgment and insight intact  o Mood and Affect  o : mood normal, affect appropriate              Assessment  · Anemia     285.9/D64.9  · Memory changes     780.93/R41.3      Plan  · Orders  o B12 Folate levels (B12FO) - 285.9/D64.9 - 03/09/2021  o CBC with Auto Diff [facility] (62457) - 285.9/D64.9 - 03/09/2021  o Iron panel (iron, TIBC, transferrin saturation) (58951, 96300, 29268) - 285.9/D64.9 - 03/09/2021  o CMP [facility] (80074) - 780.93/R41.3 - 03/09/2021  o Thyroid Profile (03320, THYII, 62212) - 285.9/D64.9 - 03/09/2021  o ACO-14: Influenza immunization was not administered for reasons documented [facility] () - - 03/09/2021  o ACO-39: Current medications updated and reviewed (, 1159F) - - 03/09/2021  o Heavy metals panel (arsenic, mercury, lead) blood QN (18001, 81720, 41439) - 780.93/R41.3 - 03/09/2021  · Medications  o Medications have been Reconciled  o Transition of Care or Provider Policy  · Instructions  o Patient was educated/instructed on their diagnosis, treatment and medications prior to discharge from the clinic today.  o Minutes spent with patient including greater than 50% in Education/Counseling/Care Coordination.  o Time spent with the patient was minutes, more than 50% face to face. 25 MINUTES  · Disposition  o Return Visit Request in/on 1 week +/- 0 days (43538).            Electronically Signed by: Isis Ordonez MD -Author on March 9, 2021 09:40:18 AM

## 2021-05-15 VITALS
BODY MASS INDEX: 36.65 KG/M2 | OXYGEN SATURATION: 97 % | HEIGHT: 61 IN | TEMPERATURE: 97.9 F | SYSTOLIC BLOOD PRESSURE: 130 MMHG | HEART RATE: 75 BPM | WEIGHT: 194.12 LBS | DIASTOLIC BLOOD PRESSURE: 78 MMHG

## 2021-05-15 VITALS
RESPIRATION RATE: 14 BRPM | TEMPERATURE: 97 F | HEIGHT: 61 IN | OXYGEN SATURATION: 97 % | BODY MASS INDEX: 35.87 KG/M2 | HEART RATE: 62 BPM | SYSTOLIC BLOOD PRESSURE: 132 MMHG | DIASTOLIC BLOOD PRESSURE: 78 MMHG | WEIGHT: 190 LBS

## 2021-05-15 VITALS
WEIGHT: 188 LBS | SYSTOLIC BLOOD PRESSURE: 134 MMHG | HEART RATE: 55 BPM | OXYGEN SATURATION: 98 % | BODY MASS INDEX: 35.5 KG/M2 | HEIGHT: 61 IN | TEMPERATURE: 97.8 F | DIASTOLIC BLOOD PRESSURE: 76 MMHG

## 2021-05-15 VITALS
OXYGEN SATURATION: 97 % | SYSTOLIC BLOOD PRESSURE: 128 MMHG | WEIGHT: 196.25 LBS | BODY MASS INDEX: 37.05 KG/M2 | TEMPERATURE: 98.1 F | DIASTOLIC BLOOD PRESSURE: 68 MMHG | HEIGHT: 61 IN | HEART RATE: 80 BPM

## 2021-05-16 VITALS
RESPIRATION RATE: 16 BRPM | HEART RATE: 60 BPM | OXYGEN SATURATION: 98 % | TEMPERATURE: 97.9 F | HEIGHT: 61 IN | BODY MASS INDEX: 35.87 KG/M2 | WEIGHT: 190 LBS | SYSTOLIC BLOOD PRESSURE: 128 MMHG | DIASTOLIC BLOOD PRESSURE: 78 MMHG

## 2021-05-16 VITALS
SYSTOLIC BLOOD PRESSURE: 124 MMHG | BODY MASS INDEX: 35.3 KG/M2 | WEIGHT: 187 LBS | DIASTOLIC BLOOD PRESSURE: 64 MMHG | TEMPERATURE: 97.5 F | HEIGHT: 61 IN | RESPIRATION RATE: 14 BRPM | OXYGEN SATURATION: 99 % | HEART RATE: 64 BPM

## 2021-06-05 NOTE — PROGRESS NOTES
"   Progress Note      Patient Name: Suzette Wilson   Patient ID: [id number]   Sex: Female   YOB: 1950    Primary Care Provider: Isis Ordonez MD   Referring Provider: Isis Ordonez MD    Visit Date: May 6, 2021    Provider: Isis Ordonez MD   Location: [facility]   Location Address: [address] [place] [address], [address]  [id number]   Location Phone: [phone]          Chief Complaint  · \"I'm feeling bad\"      History Of Present Illness  Suzette Wilson is a 70 year old /White female who presents for evaluation and treatment of:      Pt reports she started to feel bad Monday night and \"felt like a mac truck hit her\".  No fever but chills, has decreased appetite.  Pt has an occasional dry cough.  No runny nose or sore throat.  Has had a headache. Pt reports she has not had her COVID vaccine yet.  Pt reports her daughter had a cold rag to her head before they left to come to the office today so she seems to be feeling ill also.   On exam patient has what sounds like crackles in the LLL.  I will be getting a urinalysis and if negative will get COVID test and CXR.       Past Medical History  Hemorrhoids; Hypertension; Left Knee Osteoarthritis ; Reflux; Right Knee Medial Meniscus Tear         Past Surgical History  Colonoscopy; Cystocele; Deep plane facelift; EGD; Rectocele; Tonsillectomy         Medication List  AcipHex 20 mg oral tablet,delayed release (DR/EC); biotin 5 mg oral tablet; Blood Pressure Cuff miscellaneous misc; Flonase Allergy Relief 50 mcg/actuation nasal spray,suspension; lisinopril 20 mg oral tablet; magnesium 250 mg oral tablet; meloxicam 15 mg oral tablet; naproxen 500 mg oral tablet; Premarin 0.625 mg/gram vaginal cream; Toprol XL 50 mg oral tablet extended release 24 hr         Allergy List  Percocet         Family Medical History  Stroke; Alzheimer's Disease; - No Family History of Colorectal Cancer         Reproductive History   3 " "Para 3 0 0 0       Social History  Alcohol (Current every day); Claustrophobic?; lives with spouse; .; Recreational Drug Use (Never); Retired.; Tobacco (Never)         Review of Systems  · Constitutional  o Admits  o : fatigue, chills, body aches  o Denies  o : fever, weight loss, weight gain  · Eyes  o Denies  o : eye discomfort, eye pain  · HENT  o Denies  o : vertigo, nasal congestion  · Cardiovascular  o Admits  o : irregular heart beats  o Denies  o : chest pain  · Respiratory  o Admits  o : wheezing, cough  · Gastrointestinal  o Admits  o : loss of appetite  o Denies  o : nausea, vomiting  · Genitourinary  o Denies  o : frequency, dysuria  · Integument  o Denies  o : rash, itching  · Neurologic  o Denies  o : muscular weakness, memory difficulties  · Musculoskeletal  o Denies  o : joint swelling, muscle pain  · Psychiatric  o Denies  o : anxiety, depression  · Heme-Lymph  o Denies  o : lightheadedness, easy bleeding  · Allergic-Immunologic  o Denies  o : sinus allergy symptoms, allergic dermatitis      Vitals  Date Time BP Position Site L\R Cuff Size HR RR TEMP (F) WT  HT  BMI kg/m2 BSA m2 O2 Sat FR L/min FiO2 HC       05/06/2021 01:40 /80 Sitting    93 - R 14 98.1 180lbs 0oz 5'  1\" 34.01 1.87 98 %      05/06/2021 02:02 /70 Sitting    70 - R 16 97.6 223lbs 16oz 5'  5\" 37.28 2.16 96 %            Physical Examination  · Constitutional  o Appearance  o : well-nourished, in no acute distress  · Eyes  o Conjunctivae  o : conjunctivae normal  o Sclerae  o : sclerae white  o Pupils and Irises  o : pupils equal, round, and reactive to light and accommodation bilaterally  o Eyelids/Ocular Adnexae  o : eyelid appearance normal, no exudates present  · Ears, Nose, Mouth and Throat  o Ears  o :   § External Ears  § : external auditory canal appearance within normal limits, no discharge present  § Otoscopic Examination  § : tympanic membrane appearance within normal limits bilaterally  o Nose  o : "   § External Nose  § : appearance normal  § Nasopharynx  § : no discharge present  o Oral Cavity  o :   § Oral Mucosa  § : oral mucosa normal  § Lips  § : lip appearance normal  o Throat  o :   § Oropharynx  § : no inflammation or lesions present, tonsils within normal limits  · Neck  o Inspection/Palpation  o : normal appearance, no masses or tenderness, trachea midline  o Range of Motion  o : cervical range of motion within normal limits  o Thyroid  o : gland size normal, nontender, no nodules or masses present on palpation  o Jugular Veins  o : JVP normal  · Respiratory  o Respiratory Effort  o : breathing unlabored  o Inspection of Chest  o : normal appearance  o Auscultation of Lungs  o : rhonchi present   · Cardiovascular  o Heart  o :   § Auscultation of Heart  § : regular rate and rhythm, no murmurs, gallops or rubs  o Peripheral Vascular System  o :   § Extremities  § : no edema  · Gastrointestinal  o Abdominal Examination  o : abdomen nontender to palpation, tone normal without rigidity or guarding, no masses present, bowel sounds present in all four quadrants  o Liver and spleen  o : no hepatomegaly present, liver nontender to palpation, spleen not palpable  o Hernias  o : no hernias present  · Lymphatic  o Neck  o : no lymphadenopathy present  · Musculoskeletal  o Spine  o :   § Inspection/Palpation  § : no spinal tenderness, scoliosis or kyphosis present  § Stability  § : no subluxations present  § Range of Motion  § : spine range of motion normal  o Right Upper Extremity  o :   § Inspection/Palpation  § : no tenderness to palpation, ROM normal  o Left Upper Extremity  o :   § Inspection/Palpation  § : no tenderness to palpation, ROM normal  o Right Lower Extremity  o :   § Inspection/Palpation  § : no joint or limb tenderness to palpation, ROM normal  o Left Lower Extremity  o :   § Inspection/Palpation  § : no joint or limb tenderness to palpation, ROM normal  · Skin and Subcutaneous Tissue  o General  Inspection  o : no rashes or lesions present, no areas of discoloration  o Body Hair  o : scalp palpation normal, hair normal for age, general body hair distribution normal for age  o Digits and Nails  o : no clubbing, cyanosis, deformities or edema present, normal appearing nails  · Neurologic  o Mental Status Examination  o :   § Orientation  § : grossly oriented to person, place and time  o Gait and Station  o : normal gait, able to stand without difficulty  · Psychiatric  o Judgement and Insight  o : judgment and insight intact  o Mood and Affect  o : mood normal, affect appropriate          Results  · In-Office Procedures  o Lab procedure  § IOP - Urinalysis without Microscopy (Clinitek) [facility] ([address]25)   § Color Ur: Yellow   § Clarity Ur: Clear   § Glucose Ur Ql Strip: Negative   § Bilirub Ur Ql Strip: Negative   § Ketones Ur Ql Strip: Negative   § Sp Gr Ur Qn: greater than 1.030   § Hgb Ur Ql Strip: Moderate   § pH Ur-LsCnc: 5.5   § Prot Ur Ql Strip: Negative   § Urobilinogen Ur Strip-mCnc: 0.2 E.U./dL   § Nitrite Ur Ql Strip: Positive   § WBC Est Ur Ql Strip: Large   § IOP - Influenza A/B Test (29431)   § Influenza A: Negative   § Influenza B: Negative   § Internal Control Verified?: Yes       Assessment  · Cough     786.2/R05  · Fatigue     780.79/R53.83  · Urinary tract infection     599.0/N39.0      Plan  · Orders  o Chest xray 2 views [facility] (11007) - 786.2/R05 - 05/06/2021  o Urine culture (81980, 79144) - 599.0/N39.0 - 05/06/2021  o ACO-39: Current medications updated and reviewed (1159F, ) - - 05/06/2021  · Medications  o Cipro 500 mg oral tablet   SIG: take 1 tablet (500 mg) by oral route every 12 hours for 7 days   DISP: (14) Tablet with 0 refills  Prescribed on 05/06/2021     o Medications have been Reconciled  o Transition of Care or Provider Policy  · Instructions  o Patient was educated/instructed on their diagnosis, treatment and medications prior to discharge from the clinic today.  o Minutes  spent with patient including greater than 50% in Education/Counseling/Care Coordination.  o Time spent with the patient was minutes, more than 50% face to face. 25 minutes  · Disposition  o Call or Return if symptoms worsen or persist.            Electronically Signed by: Isis Ordonez MD -Author on May 25, 2021 09:09:04 AM

## 2021-07-13 RX ORDER — LISINOPRIL 20 MG/1
20 TABLET ORAL DAILY
COMMUNITY
End: 2021-07-13 | Stop reason: SDUPTHER

## 2021-07-13 RX ORDER — METOPROLOL SUCCINATE 50 MG/1
50 TABLET, EXTENDED RELEASE ORAL DAILY
COMMUNITY
End: 2021-07-13 | Stop reason: SDUPTHER

## 2021-07-13 RX ORDER — RABEPRAZOLE SODIUM 20 MG/1
20 TABLET, DELAYED RELEASE ORAL DAILY
COMMUNITY
End: 2021-07-13 | Stop reason: SDUPTHER

## 2021-07-13 RX ORDER — MELOXICAM 15 MG/1
15 TABLET ORAL DAILY
COMMUNITY
End: 2021-07-13 | Stop reason: SDUPTHER

## 2021-07-13 NOTE — TELEPHONE ENCOUNTER
Caller: Suzette Wilson    Relationship: Self    Best call back number: [phone]    Medication needed:   Requested Prescriptions      No prescriptions requested or ordered in this encounter       When do you need the refill by: ASAP    What additional details did the patient provide when requesting the medication: PATIENT IS NEEDING PREMARIN CREAM EVERY THIRD DAY, MELOXICAM 15MG 1 TABLET DAILY, METOPROLOL SECC 50MG 1 TABLET DAILY, LISINOPRIL 20MG 1 TABLET DAILY, RABEprazole 20mg TBEC 1 tablet daily    Does the patient have less than a 3 day supply:  [x] Yes  [] No    What is the patient's preferred pharmacy:         [facility] [address] [place] [address]    [phone]

## 2021-07-15 ENCOUNTER — TELEPHONE (OUTPATIENT)
Dept: FAMILY MEDICINE CLINIC | Facility: CLINIC | Age: 71
End: 2021-07-15

## 2021-07-15 VITALS
TEMPERATURE: 97.6 F | OXYGEN SATURATION: 96 % | HEIGHT: 65 IN | RESPIRATION RATE: 16 BRPM | HEART RATE: 70 BPM | WEIGHT: 224 LBS | DIASTOLIC BLOOD PRESSURE: 70 MMHG | SYSTOLIC BLOOD PRESSURE: 120 MMHG | BODY MASS INDEX: 37.32 KG/M2

## 2021-07-15 VITALS
SYSTOLIC BLOOD PRESSURE: 128 MMHG | WEIGHT: 180 LBS | RESPIRATION RATE: 14 BRPM | HEIGHT: 61 IN | OXYGEN SATURATION: 98 % | DIASTOLIC BLOOD PRESSURE: 80 MMHG | TEMPERATURE: 98.1 F | HEART RATE: 93 BPM | BODY MASS INDEX: 33.99 KG/M2

## 2021-07-15 RX ORDER — MELOXICAM 15 MG/1
15 TABLET ORAL DAILY
Qty: 30 TABLET | Refills: 2 | Status: SHIPPED | OUTPATIENT
Start: 2021-07-15 | End: 2021-08-23 | Stop reason: SDUPTHER

## 2021-07-15 RX ORDER — RABEPRAZOLE SODIUM 20 MG/1
20 TABLET, DELAYED RELEASE ORAL DAILY
Qty: 30 TABLET | Refills: 2 | Status: SHIPPED | OUTPATIENT
Start: 2021-07-15 | End: 2021-08-23 | Stop reason: SDUPTHER

## 2021-07-15 RX ORDER — LISINOPRIL 20 MG/1
20 TABLET ORAL DAILY
Qty: 30 TABLET | Refills: 2 | Status: SHIPPED | OUTPATIENT
Start: 2021-07-15 | End: 2021-08-23 | Stop reason: SDUPTHER

## 2021-07-15 RX ORDER — METOPROLOL SUCCINATE 50 MG/1
50 TABLET, EXTENDED RELEASE ORAL DAILY
Qty: 30 TABLET | Refills: 2 | Status: SHIPPED | OUTPATIENT
Start: 2021-07-15 | End: 2021-08-23 | Stop reason: SDUPTHER

## 2021-07-15 NOTE — TELEPHONE ENCOUNTER
Caller: [facility] - [place], [place] - [address] - [phone] [facility] [phone] FX    Relationship to patient: Pharmacy    Best call back number: [phone]    Patient is needing: PHARMACY CALLED AND IS REQUESTING A CLINICAL CALL BACK TO VERIFY PRESCRIPTION INSTRUCTIONS FOR conjugated estrogens (PREMARIN) 0.625 MG/GM vaginal cream      PLEASE CALL AND ADVISE.

## 2021-07-16 NOTE — TELEPHONE ENCOUNTER
Hub staff attempted to follow warm transfer process and was unsuccessful     Caller: [facility] - [place], [place] - [address] - [phone]  - [phone] FX    Relationship to patient: Pharmacy    Best call back number: [phone]    Patient is needing: PHARMACY REPRESENTATIVE ATTEMPTED TO RETURN CALL. I WAS UNABLE TO WARM TRANSFER. PLEASE CALL BACK TO INFORM. THE REPRESENTATIVE STATED THAT THE ANSWER TO THEIR INQUIRY COULD BE LEFT ON THE VOICEMAIL IF NEED BE AS IT IS A SECURE VOICEMAIL

## 2021-08-20 NOTE — TELEPHONE ENCOUNTER
Caller: Suzette Wilson    Relationship: Self    Best call back number: [phone]  Medication needed:   Requested Prescriptions      No prescriptions requested or ordered in this encounter       When do you need the refill by: ASAP  What additional details did the patient provide when requesting the medication: WANTING ALL HER MEDICATION REFILLED FOR 90 DAYS     Does the patient have less than a 3 day supply:  [x] Yes  [] No    What is the patient's preferred pharmacy: [facility] -  [place] - [address] [facility] [phone] [facility] [phone]

## 2021-08-23 RX ORDER — METOPROLOL SUCCINATE 50 MG/1
50 TABLET, EXTENDED RELEASE ORAL DAILY
Qty: 30 TABLET | Refills: 2 | Status: CANCELLED | OUTPATIENT
Start: 2021-08-23

## 2021-08-23 RX ORDER — METOPROLOL SUCCINATE 50 MG/1
50 TABLET, EXTENDED RELEASE ORAL DAILY
Qty: 30 TABLET | Refills: 2 | Status: SHIPPED | OUTPATIENT
Start: 2021-08-23 | End: 2021-08-24 | Stop reason: SDUPTHER

## 2021-08-23 RX ORDER — RABEPRAZOLE SODIUM 20 MG/1
20 TABLET, DELAYED RELEASE ORAL DAILY
Qty: 30 TABLET | Refills: 2 | Status: CANCELLED | OUTPATIENT
Start: 2021-08-23

## 2021-08-23 RX ORDER — MELOXICAM 15 MG/1
15 TABLET ORAL DAILY
Qty: 30 TABLET | Refills: 2 | Status: SHIPPED | OUTPATIENT
Start: 2021-08-23 | End: 2021-08-24 | Stop reason: SDUPTHER

## 2021-08-23 RX ORDER — RABEPRAZOLE SODIUM 20 MG/1
20 TABLET, DELAYED RELEASE ORAL DAILY
Qty: 30 TABLET | Refills: 2 | Status: SHIPPED | OUTPATIENT
Start: 2021-08-23 | End: 2021-08-24 | Stop reason: SDUPTHER

## 2021-08-23 RX ORDER — MELOXICAM 15 MG/1
15 TABLET ORAL DAILY
Qty: 30 TABLET | Refills: 2 | Status: CANCELLED | OUTPATIENT
Start: 2021-08-23

## 2021-08-23 RX ORDER — LISINOPRIL 20 MG/1
20 TABLET ORAL DAILY
Qty: 30 TABLET | Refills: 2 | Status: CANCELLED | OUTPATIENT
Start: 2021-08-23

## 2021-08-23 RX ORDER — LISINOPRIL 20 MG/1
20 TABLET ORAL DAILY
Qty: 30 TABLET | Refills: 2 | Status: SHIPPED | OUTPATIENT
Start: 2021-08-23 | End: 2021-08-24 | Stop reason: SDUPTHER

## 2021-08-23 NOTE — TELEPHONE ENCOUNTER
Caller: GenevaSuzi whiteglo DE JESUS    Relationship: Self    Best call back number: [phone]    Medication needed:   Requested Prescriptions     Pending Prescriptions Disp Refills   • RABEprazole (ACIPHEX) 20 MG EC tablet 30 tablet 2     Sig: Take 1 tablet by mouth Daily.   • meloxicam (MOBIC) 15 MG tablet 30 tablet 2     Sig: Take 1 tablet by mouth Daily.   • lisinopril (PRINIVIL,ZESTRIL) 20 MG tablet 30 tablet 2     Sig: Take 1 tablet by mouth Daily.   • metoprolol succinate XL (TOPROL-XL) 50 MG 24 hr tablet 30 tablet 2     Sig: Take 1 tablet by mouth Daily.   • conjugated estrogens (PREMARIN) 0.625 MG/GM vaginal cream 30 g 1     Sig: Insert  into the vagina Daily.       When do you need the refill by: 08/23/2021    What additional details did the patient provide when requesting the medication: THE PATIENT STATED SHE IS COMPLETELY OUT OF HER LISINOPRIL, METOPROLOL, AND RABEPRAZOLE. SHE NEEDS REFILLS SENT ASAP.  THE PATIENT WOULD LIKE A CALL BACK WHEN THESE ARE SENT TO THE PHARMACY.    Does the patient have less than a 3 day supply:  [x] Yes  [] No    What is the patient's preferred pharmacy: [facility] -  [place], [place] - [address] [facility] [phone] [facility] [phone]

## 2021-08-24 RX ORDER — MELOXICAM 15 MG/1
15 TABLET ORAL DAILY
Qty: 30 TABLET | Refills: 2 | Status: SHIPPED | OUTPATIENT
Start: 2021-08-24 | End: 2021-09-21 | Stop reason: SDUPTHER

## 2021-08-24 RX ORDER — METOPROLOL SUCCINATE 50 MG/1
50 TABLET, EXTENDED RELEASE ORAL DAILY
Qty: 30 TABLET | Refills: 2 | Status: SHIPPED | OUTPATIENT
Start: 2021-08-24 | End: 2021-09-21 | Stop reason: SDUPTHER

## 2021-08-24 RX ORDER — LISINOPRIL 20 MG/1
20 TABLET ORAL DAILY
Qty: 30 TABLET | Refills: 2 | Status: SHIPPED | OUTPATIENT
Start: 2021-08-24 | End: 2021-09-21 | Stop reason: SDUPTHER

## 2021-08-24 RX ORDER — RABEPRAZOLE SODIUM 20 MG/1
20 TABLET, DELAYED RELEASE ORAL DAILY
Qty: 30 TABLET | Refills: 2 | Status: SHIPPED | OUTPATIENT
Start: 2021-08-24 | End: 2021-09-21 | Stop reason: SDUPTHER

## 2021-09-21 ENCOUNTER — TELEMEDICINE (OUTPATIENT)
Dept: FAMILY MEDICINE CLINIC | Facility: CLINIC | Age: 71
End: 2021-09-21

## 2021-09-21 DIAGNOSIS — K21.9 GASTROESOPHAGEAL REFLUX DISEASE, UNSPECIFIED WHETHER ESOPHAGITIS PRESENT: ICD-10-CM

## 2021-09-21 DIAGNOSIS — I10 ESSENTIAL HYPERTENSION: Primary | ICD-10-CM

## 2021-09-21 DIAGNOSIS — R41.3 MEMORY PROBLEM: ICD-10-CM

## 2021-09-21 PROBLEM — N39.41 URGE INCONTINENCE: Status: ACTIVE | Noted: 2018-11-07

## 2021-09-21 PROCEDURE — 99213 OFFICE O/P EST LOW 20 MIN: CPT | Performed by: FAMILY MEDICINE

## 2021-09-21 RX ORDER — METOPROLOL SUCCINATE 50 MG/1
50 TABLET, EXTENDED RELEASE ORAL DAILY
Qty: 90 TABLET | Refills: 3 | Status: SHIPPED | OUTPATIENT
Start: 2021-09-21 | End: 2022-04-12 | Stop reason: SDUPTHER

## 2021-09-21 RX ORDER — MELOXICAM 15 MG/1
15 TABLET ORAL DAILY
Qty: 90 TABLET | Refills: 1 | Status: SHIPPED | OUTPATIENT
Start: 2021-09-21 | End: 2021-10-12 | Stop reason: SDUPTHER

## 2021-09-21 RX ORDER — RABEPRAZOLE SODIUM 20 MG/1
20 TABLET, DELAYED RELEASE ORAL DAILY
Qty: 90 TABLET | Refills: 3 | Status: SHIPPED | OUTPATIENT
Start: 2021-09-21 | End: 2021-12-20

## 2021-09-21 RX ORDER — LISINOPRIL 20 MG/1
20 TABLET ORAL DAILY
Qty: 90 TABLET | Refills: 3 | Status: SHIPPED | OUTPATIENT
Start: 2021-09-21 | End: 2022-04-12 | Stop reason: SDUPTHER

## 2021-09-21 RX ORDER — LANOLIN ALCOHOL/MO/W.PET/CERES
CREAM (GRAM) TOPICAL
COMMUNITY
End: 2022-12-29

## 2021-09-21 RX ORDER — NAPROXEN 500 MG/1
TABLET ORAL
COMMUNITY
End: 2021-09-21

## 2021-09-21 RX ORDER — MULTIVITAMIN WITH IRON
TABLET ORAL
COMMUNITY

## 2021-09-21 RX ORDER — ESOMEPRAZOLE MAGNESIUM 40 MG/1
CAPSULE, DELAYED RELEASE ORAL
COMMUNITY
End: 2021-09-21

## 2021-09-21 NOTE — PROGRESS NOTES
Chief Complaint  Chief Complaint   Patient presents with   • Memory Loss      seems to be fine    • Hypertension       HPI:  Suzette Wilson presents to [facility]    Memory- seems to be doing better.     HTN- Pt needs refills on her bp medications.     Pt reports she has decided not to get the COVID vaccine because she is afraid of the vaccine.       Medical History:  Past History:  Medical History: has a past medical history of Acid reflux, Hemorrhoids, Hypertension, Medial meniscus tear (08/11/2016), and Osteoarthritis of left knee (08/24/2016).   Surgical History: has a past surgical history that includes Colonoscopy; Cystocele repair; Facelift; Esophagogastroduodenoscopy (2017); Rectal surgery; and Tonsillectomy (1968).   Family History: family history includes Alzheimer's disease in her brother and sister; Stroke in her father and mother.   Social History: reports that she has never smoked. She has never used smokeless tobacco. She reports current alcohol use. She reports that she does not use drugs.    There is no immunization history on file for this patient.      Allergies: Patient has no known allergies.     Medications:  Current Outpatient Medications on File Prior to Visit   Medication Sig Dispense Refill   • conjugated estrogens (PREMARIN) 0.625 MG/GM vaginal cream Insert  into the vagina Daily. 30 g 1   • Magnesium 250 MG tablet magnesium 250 mg oral tablet take 1 tablet by oral route once a day (at bedtime)   Active     • melatonin 3 MG tablet melatonin 3 mg oral tablet take 1 tablet by oral route once a day (at bedtime)   Suspended     • [DISCONTINUED] esomeprazole (nexIUM) 40 MG capsule Nexium 40 mg oral capsule,delayed release(DR/EC) take 1 capsule (40 mg) by oral route once daily   Suspended     • [DISCONTINUED] lisinopril (PRINIVIL,ZESTRIL) 20 MG tablet Take 1 tablet by mouth Daily. 30 tablet 2   • [DISCONTINUED] meloxicam (MOBIC) 15 MG tablet Take 1 tablet by  mouth Daily. 30 tablet 2   • [DISCONTINUED] metoprolol succinate XL (TOPROL-XL) 50 MG 24 hr tablet Take 1 tablet by mouth Daily. 30 tablet 2   • [DISCONTINUED] naproxen (NAPROSYN) 500 MG tablet naproxen 500 mg oral tablet take 1 tablet (500 mg) by oral route 2 times per day with food   Active     • [DISCONTINUED] RABEprazole (ACIPHEX) 20 MG EC tablet Take 1 tablet by mouth Daily. 30 tablet 2   • [DISCONTINUED] metoprolol tartrate (LOPRESSOR) 25 MG tablet Take 25 mg by mouth Daily.       No current facility-administered medications on file prior to visit.        Health Maintenance Due   Topic Date Due   • DXA SCAN  Never done   • ANNUAL PHYSICAL  Never done   • COVID-19 Vaccine (1) Never done   • TDAP/TD VACCINES (1 - Tdap) Never done   • ZOSTER VACCINE (1 of 2) Never done   • Pneumococcal Vaccine 65+ (1 of 1 - PPSV23) Never done   • HEPATITIS C SCREENING  Never done       Vital Signs:   There were no vitals filed for this visit.   There is no height or weight on file to calculate BMI.     ROS:  Review of Systems   Constitutional: Negative for fatigue and fever.   HENT: Negative for congestion, ear pain and sinus pressure.    Respiratory: Negative for cough, chest tightness and shortness of breath.    Cardiovascular: Negative for chest pain, palpitations and leg swelling.   Gastrointestinal: Negative for abdominal pain and diarrhea.   Genitourinary: Negative for dysuria and frequency.   Neurological: Negative for speech difficulty, headache and confusion.   Psychiatric/Behavioral: Negative for agitation and behavioral problems.          Physical Exam  Constitutional:       Appearance: Normal appearance.   HENT:      Nose: Nose normal.   Eyes:      Extraocular Movements: Extraocular movements intact.   Pulmonary:      Effort: Pulmonary effort is normal.   Neurological:      Mental Status: She is alert and oriented to person, place, and time. Mental status is at baseline.   Psychiatric:         Mood and Affect: Mood  normal.         Thought Content: Thought content normal.         Judgment: Judgment normal.          Result Review         Diagnoses and all orders for this visit:    1. Essential hypertension (Primary)  Assessment & Plan:  Hypertension is unchanged.  Continue current treatment regimen.  Blood pressure will be reassessed in 3 months.    Orders:  -     lisinopril (PRINIVIL,ZESTRIL) 20 MG tablet; Take 1 tablet by mouth Daily for 90 days.  Dispense: 90 tablet; Refill: 3  -     metoprolol succinate XL (TOPROL-XL) 50 MG 24 hr tablet; Take 1 tablet by mouth Daily for 90 days.  Dispense: 90 tablet; Refill: 3    2. Memory problem  Assessment & Plan:  Not changed.  Recommend to keep doing brain games to keep memory sharp.      3. Gastroesophageal reflux disease, unspecified whether esophagitis present  -     RABEprazole (ACIPHEX) 20 MG EC tablet; Take 1 tablet by mouth Daily for 90 days.  Dispense: 90 tablet; Refill: 3    Other orders  -     meloxicam (MOBIC) 15 MG tablet; Take 1 tablet by mouth Daily for 90 days.  Dispense: 90 tablet; Refill: 1        Smoking Cessation:    Suzette Dubonubbs  reports that she has never smoked. She has never used smokeless tobacco.          Follow Up   Return in about 3 months (around 12/21/2021).  Patient was given instructions and counseling regarding her condition or for health maintenance advice. Please see specific information pulled into the AVS if appropriate.       Isis Ordonez MD

## 2021-10-12 RX ORDER — MELOXICAM 15 MG/1
15 TABLET ORAL DAILY
Qty: 90 TABLET | Refills: 1 | Status: SHIPPED | OUTPATIENT
Start: 2021-10-12 | End: 2021-11-19 | Stop reason: SDUPTHER

## 2021-10-27 ENCOUNTER — TELEPHONE (OUTPATIENT)
Dept: FAMILY MEDICINE CLINIC | Facility: CLINIC | Age: 71
End: 2021-10-27

## 2021-10-27 NOTE — TELEPHONE ENCOUNTER
Caller: Suzette Wilson    Relationship: Self      Medication requested (name and dosage):     Requested Prescriptions:   NAPROXIN    Pharmacy where request should be sent: [facility] [place] - [place] - [address] - [phone] [facility] [phone]   [phone]    Best call back number: [phone]     Does the patient have less than a 3 day supply:  [x] Yes  [] No    Khanh Plummer Rep   10/27/21 13:13 EDT

## 2021-11-08 ENCOUNTER — OFFICE VISIT (OUTPATIENT)
Dept: FAMILY MEDICINE CLINIC | Facility: CLINIC | Age: 71
End: 2021-11-08

## 2021-11-08 VITALS
SYSTOLIC BLOOD PRESSURE: 122 MMHG | OXYGEN SATURATION: 96 % | HEART RATE: 67 BPM | WEIGHT: 176.2 LBS | BODY MASS INDEX: 33.27 KG/M2 | TEMPERATURE: 97.6 F | DIASTOLIC BLOOD PRESSURE: 68 MMHG | HEIGHT: 61 IN

## 2021-11-08 DIAGNOSIS — M62.838 MUSCLE SPASMS OF BOTH LOWER EXTREMITIES: ICD-10-CM

## 2021-11-08 DIAGNOSIS — I10 PRIMARY HYPERTENSION: ICD-10-CM

## 2021-11-08 DIAGNOSIS — R45.86 MOOD SWINGS: ICD-10-CM

## 2021-11-08 DIAGNOSIS — D64.9 ANEMIA, UNSPECIFIED TYPE: Primary | ICD-10-CM

## 2021-11-08 DIAGNOSIS — G57.01 SCIATIC NERVE DISEASE, RIGHT: ICD-10-CM

## 2021-11-08 LAB
ALBUMIN SERPL-MCNC: 4.1 G/DL (ref 3.5–5.2)
ALBUMIN/GLOB SERPL: 1.6 G/DL
ALP SERPL-CCNC: 53 U/L (ref 39–117)
ALT SERPL W P-5'-P-CCNC: 15 U/L (ref 1–33)
ANION GAP SERPL CALCULATED.3IONS-SCNC: 7.5 MMOL/L (ref 5–15)
AST SERPL-CCNC: 28 U/L (ref 1–32)
BASOPHILS # BLD AUTO: 0.04 10*3/MM3 (ref 0–0.2)
BASOPHILS NFR BLD AUTO: 0.7 % (ref 0–1.5)
BILIRUB SERPL-MCNC: 0.6 MG/DL (ref 0–1.2)
BUN SERPL-MCNC: 17 MG/DL (ref 8–23)
BUN/CREAT SERPL: 19.8 (ref 7–25)
CALCIUM SPEC-SCNC: 9.3 MG/DL (ref 8.6–10.5)
CHLORIDE SERPL-SCNC: 103 MMOL/L (ref 98–107)
CO2 SERPL-SCNC: 25.5 MMOL/L (ref 22–29)
CREAT SERPL-MCNC: 0.86 MG/DL (ref 0.57–1)
DEPRECATED RDW RBC AUTO: 42.7 FL (ref 37–54)
EOSINOPHIL # BLD AUTO: 0.09 10*3/MM3 (ref 0–0.4)
EOSINOPHIL NFR BLD AUTO: 1.7 % (ref 0.3–6.2)
ERYTHROCYTE [DISTWIDTH] IN BLOOD BY AUTOMATED COUNT: 11.8 % (ref 12.3–15.4)
GFR SERPL CREATININE-BSD FRML MDRD: 65 ML/MIN/1.73
GLOBULIN UR ELPH-MCNC: 2.5 GM/DL
GLUCOSE SERPL-MCNC: 85 MG/DL (ref 65–99)
HCT VFR BLD AUTO: 36.3 % (ref 34–46.6)
HGB BLD-MCNC: 12.3 G/DL (ref 12–15.9)
IMM GRANULOCYTES # BLD AUTO: 0.02 10*3/MM3 (ref 0–0.05)
IMM GRANULOCYTES NFR BLD AUTO: 0.4 % (ref 0–0.5)
IRON 24H UR-MRATE: 100 MCG/DL (ref 37–145)
IRON SATN MFR SERPL: 29 % (ref 20–50)
LYMPHOCYTES # BLD AUTO: 2.22 10*3/MM3 (ref 0.7–3.1)
LYMPHOCYTES NFR BLD AUTO: 41.1 % (ref 19.6–45.3)
MAGNESIUM SERPL-MCNC: 2 MG/DL (ref 1.6–2.4)
MCH RBC QN AUTO: 33.4 PG (ref 26.6–33)
MCHC RBC AUTO-ENTMCNC: 33.9 G/DL (ref 31.5–35.7)
MCV RBC AUTO: 98.6 FL (ref 79–97)
MONOCYTES # BLD AUTO: 0.46 10*3/MM3 (ref 0.1–0.9)
MONOCYTES NFR BLD AUTO: 8.5 % (ref 5–12)
NEUTROPHILS NFR BLD AUTO: 2.57 10*3/MM3 (ref 1.7–7)
NEUTROPHILS NFR BLD AUTO: 47.6 % (ref 42.7–76)
NRBC BLD AUTO-RTO: 0.2 /100 WBC (ref 0–0.2)
PLATELET # BLD AUTO: 229 10*3/MM3 (ref 140–450)
PMV BLD AUTO: 11.7 FL (ref 6–12)
POTASSIUM SERPL-SCNC: 4.7 MMOL/L (ref 3.5–5.2)
PROT SERPL-MCNC: 6.6 G/DL (ref 6–8.5)
RBC # BLD AUTO: 3.68 10*6/MM3 (ref 3.77–5.28)
SODIUM SERPL-SCNC: 136 MMOL/L (ref 136–145)
TIBC SERPL-MCNC: 349 MCG/DL (ref 298–536)
TRANSFERRIN SERPL-MCNC: 234 MG/DL (ref 200–360)
WBC # BLD AUTO: 5.4 10*3/MM3 (ref 3.4–10.8)

## 2021-11-08 PROCEDURE — 83735 ASSAY OF MAGNESIUM: CPT | Performed by: FAMILY MEDICINE

## 2021-11-08 PROCEDURE — 83540 ASSAY OF IRON: CPT | Performed by: FAMILY MEDICINE

## 2021-11-08 PROCEDURE — 80053 COMPREHEN METABOLIC PANEL: CPT | Performed by: FAMILY MEDICINE

## 2021-11-08 PROCEDURE — 85025 COMPLETE CBC W/AUTO DIFF WBC: CPT | Performed by: FAMILY MEDICINE

## 2021-11-08 PROCEDURE — 99214 OFFICE O/P EST MOD 30 MIN: CPT | Performed by: FAMILY MEDICINE

## 2021-11-08 PROCEDURE — 84466 ASSAY OF TRANSFERRIN: CPT | Performed by: FAMILY MEDICINE

## 2021-11-08 RX ORDER — NAPROXEN 375 MG/1
375 TABLET ORAL 2 TIMES DAILY PRN
COMMUNITY
End: 2021-11-08 | Stop reason: SDUPTHER

## 2021-11-08 RX ORDER — TIZANIDINE 4 MG/1
4 TABLET ORAL NIGHTLY PRN
Qty: 90 TABLET | Refills: 1 | Status: SHIPPED | OUTPATIENT
Start: 2021-11-08 | End: 2022-02-06

## 2021-11-08 RX ORDER — NAPROXEN 375 MG/1
375 TABLET ORAL 2 TIMES DAILY PRN
Qty: 60 TABLET | Refills: 2 | Status: SHIPPED | OUTPATIENT
Start: 2021-11-08 | End: 2021-11-19 | Stop reason: DRUGHIGH

## 2021-11-08 NOTE — PROGRESS NOTES
Chief Complaint  Chief Complaint   Patient presents with   • Leg Pain     right   • Med Refill       HPI:  Suzette Wilson presents to [facility]     Pt reports she was having right hip pain which was starting in the lower right side back and travel down the right leg.     Iron deficiency anemia-patient last CBC level was checked March 2021 and it was 11.6 hemoglobin and iron level was low at that time at 56.  I will be rechecking her CBC and iron levels today.    Muscle spasms-patient reports that she takes magnesium every night to help with her muscle spasms.  I will be checking a magnesium level she is make sure that she is not open taking magnesium.    Mood swings she is having most likely due to not being consistent with her premarin.     Hypertension-patient blood pressure today is well controlled at 122/68.  Patient is compliant with her lisinopril 20 mg once a day and metoprolol 50 mg once a day.    Medical History:  Past History:  Medical History: has a past medical history of Acid reflux, Hemorrhoids, Hypertension, Medial meniscus tear (08/11/2016), and Osteoarthritis of left knee (08/24/2016).   Surgical History: has a past surgical history that includes Colonoscopy; Cystocele repair; Facelift; Esophagogastroduodenoscopy (2017); Rectal surgery; and Tonsillectomy (1968).   Family History: family history includes Alzheimer's disease in her brother and sister; Stroke in her father and mother.   Social History: reports that she has never smoked. She has never used smokeless tobacco. She reports current alcohol use. She reports that she does not use drugs.    There is no immunization history on file for this patient.      Allergies: Patient has no known allergies.     Medications:  Current Outpatient Medications on File Prior to Visit   Medication Sig Dispense Refill   • conjugated estrogens (PREMARIN) 0.625 MG/GM vaginal cream Insert  into the vagina Daily. 30 g 1   •  "lisinopril (PRINIVIL,ZESTRIL) 20 MG tablet Take 1 tablet by mouth Daily for 90 days. 90 tablet 3   • Magnesium 250 MG tablet magnesium 250 mg oral tablet take 1 tablet by oral route once a day (at bedtime)   Active     • melatonin 3 MG tablet melatonin 3 mg oral tablet take 1 tablet by oral route once a day (at bedtime)   Suspended     • meloxicam (MOBIC) 15 MG tablet Take 1 tablet by mouth Daily for 90 days. 90 tablet 1   • metoprolol succinate XL (TOPROL-XL) 50 MG 24 hr tablet Take 1 tablet by mouth Daily for 90 days. 90 tablet 3   • RABEprazole (ACIPHEX) 20 MG EC tablet Take 1 tablet by mouth Daily for 90 days. 90 tablet 3   • [DISCONTINUED] naproxen (NAPROSYN) 375 MG tablet Take 375 mg by mouth 2 (Two) Times a Day As Needed for Mild Pain .       No current facility-administered medications on file prior to visit.        Health Maintenance Due   Topic Date Due   • DXA SCAN  Never done   • COVID-19 Vaccine (1) Never done   • TDAP/TD VACCINES (1 - Tdap) Never done   • ZOSTER VACCINE (1 of 2) Never done   • Pneumococcal Vaccine 65+ (1 of 1 - PPSV23) Never done   • HEPATITIS C SCREENING  Never done   • INFLUENZA VACCINE  Never done   • ANNUAL WELLNESS VISIT  08/21/2021       Vital Signs:   Vitals:    11/08/21 0838   BP: 122/68   Pulse: 67   Temp: 97.6 °F (36.4 °C)   SpO2: 96%   Weight: 79.9 kg (176 lb 3.2 oz)   Height: 154.9 cm (61\")      Body mass index is 33.29 kg/m².     ROS:  Review of Systems   Constitutional: Negative for fatigue and fever.   HENT: Negative for congestion, ear pain and sinus pressure.    Respiratory: Negative for cough, chest tightness and shortness of breath.    Cardiovascular: Negative for chest pain, palpitations and leg swelling.   Gastrointestinal: Negative for abdominal pain and diarrhea.   Genitourinary: Negative for dysuria and frequency.   Neurological: Negative for speech difficulty, headache and confusion.   Psychiatric/Behavioral: Negative for agitation and behavioral problems.    "       Physical Exam  Constitutional:       Appearance: Normal appearance.   HENT:      Right Ear: Tympanic membrane normal.      Left Ear: Tympanic membrane normal.      Nose: Nose normal.   Eyes:      Extraocular Movements: Extraocular movements intact.      Conjunctiva/sclera: Conjunctivae normal.      Pupils: Pupils are equal, round, and reactive to light.   Cardiovascular:      Rate and Rhythm: Normal rate and regular rhythm.   Pulmonary:      Effort: Pulmonary effort is normal.      Breath sounds: Normal breath sounds.   Abdominal:      General: Bowel sounds are normal.   Musculoskeletal:         General: Normal range of motion.      Cervical back: Normal range of motion.   Skin:     General: Skin is warm and dry.   Neurological:      General: No focal deficit present.      Mental Status: She is alert and oriented to person, place, and time.   Psychiatric:         Mood and Affect: Mood normal.         Behavior: Behavior normal.          Result Review   Comprehensive Metabolic Panel (04/29/2021 09:30)  CBC & Differential (03/09/2021 09:37)  Iron Profile (03/09/2021 09:37)       Diagnoses and all orders for this visit:    1. Anemia, unspecified type (Primary)  -     CBC & Differential  -     Iron Profile  -     Comprehensive Metabolic Panel    2. Sciatic nerve disease, right  -     naproxen (NAPROSYN) 375 MG tablet; Take 1 tablet by mouth 2 (Two) Times a Day As Needed for Mild Pain  for up to 30 days.  Dispense: 60 tablet; Refill: 2    3. Mood swings    4. Primary hypertension  Assessment & Plan:  Hypertension is improving with treatment.  Continue current treatment regimen.  Blood pressure will be reassessed in 3 months.      5. Muscle spasms of both lower extremities  -     Magnesium  -     tiZANidine (ZANAFLEX) 4 MG tablet; Take 1 tablet by mouth At Night As Needed for Muscle Spasms for up to 90 days.  Dispense: 90 tablet; Refill: 1        Smoking Cessation:    Suzette Wilson  reports that she has never  smoked. She has never used smokeless tobacco.          Follow Up  Return in about 6 months (around 5/8/2022).  Patient was given instructions and counseling regarding her condition or for health maintenance advice. Please see specific information pulled into the AVS if appropriate.       Isis Ordonez MD

## 2021-11-17 DIAGNOSIS — G57.01 SCIATIC NERVE DISEASE, RIGHT: ICD-10-CM

## 2021-11-19 DIAGNOSIS — G57.01 SCIATIC NERVE DISEASE, RIGHT: ICD-10-CM

## 2021-11-19 RX ORDER — MELOXICAM 15 MG/1
15 TABLET ORAL DAILY
Qty: 90 TABLET | Refills: 0 | Status: SHIPPED | OUTPATIENT
Start: 2021-11-19 | End: 2022-01-06 | Stop reason: SDUPTHER

## 2021-11-19 RX ORDER — NAPROXEN 500 MG/1
TABLET ORAL
Qty: 30 TABLET | Refills: 0 | Status: SHIPPED | OUTPATIENT
Start: 2021-11-19 | End: 2023-01-06

## 2021-12-21 ENCOUNTER — TELEMEDICINE (OUTPATIENT)
Dept: FAMILY MEDICINE CLINIC | Facility: CLINIC | Age: 71
End: 2021-12-21

## 2021-12-21 DIAGNOSIS — Z12.31 BREAST CANCER SCREENING BY MAMMOGRAM: Primary | ICD-10-CM

## 2021-12-21 DIAGNOSIS — M16.11 OSTEOARTHRITIS OF RIGHT HIP, UNSPECIFIED OSTEOARTHRITIS TYPE: ICD-10-CM

## 2021-12-21 PROCEDURE — 99213 OFFICE O/P EST LOW 20 MIN: CPT | Performed by: FAMILY MEDICINE

## 2021-12-21 RX ORDER — TRAMADOL HYDROCHLORIDE 50 MG/1
50 TABLET ORAL EVERY 8 HOURS PRN
Qty: 90 TABLET | Refills: 0 | Status: SHIPPED | OUTPATIENT
Start: 2021-12-21 | End: 2021-12-21

## 2021-12-21 RX ORDER — TRAMADOL HYDROCHLORIDE 50 MG/1
50 TABLET ORAL EVERY 8 HOURS PRN
Qty: 90 TABLET | Refills: 0 | Status: SHIPPED | OUTPATIENT
Start: 2021-12-21 | End: 2022-03-21

## 2021-12-21 NOTE — PROGRESS NOTES
You have chosen to receive care through a telehealth visit.  Do you consent to use a video/audio connection for your medical care today? Yes    Chief Complaint  No chief complaint on file.      HPI:  Suzette Wilson presents to [facility]     Pt reports that her right side hurts a lot and it hurts to go up and down stairs. Pt reports she has to lead up the stair with her left foot.   Pt reports that the Tramadol that she was taking before helped her a lot and she would like a refill on that medication.    Medical History:  Past History:  Medical History: has a past medical history of Acid reflux, Hemorrhoids, Hypertension, Medial meniscus tear (08/11/2016), and Osteoarthritis of left knee (08/24/2016).   Surgical History: has a past surgical history that includes Colonoscopy; Cystocele repair; Facelift; Esophagogastroduodenoscopy (2017); Rectal surgery; and Tonsillectomy (1968).   Family History: family history includes Alzheimer's disease in her brother and sister; Stroke in her father and mother.   Social History: reports that she has never smoked. She has never used smokeless tobacco. She reports current alcohol use. She reports that she does not use drugs.    There is no immunization history on file for this patient.      Allergies: Patient has no known allergies.     Medications:  Current Outpatient Medications on File Prior to Visit   Medication Sig Dispense Refill   • conjugated estrogens (PREMARIN) 0.625 MG/GM vaginal cream Insert  into the vagina Daily. 30 g 1   • lisinopril (PRINIVIL,ZESTRIL) 20 MG tablet Take 1 tablet by mouth Daily for 90 days. 90 tablet 3   • Magnesium 250 MG tablet magnesium 250 mg oral tablet take 1 tablet by oral route once a day (at bedtime)   Active     • melatonin 3 MG tablet melatonin 3 mg oral tablet take 1 tablet by oral route once a day (at bedtime)   Suspended     • meloxicam (MOBIC) 15 MG tablet Take 1 tablet by mouth Daily for 90 days. 90  tablet 0   • metoprolol succinate XL (TOPROL-XL) 50 MG 24 hr tablet Take 1 tablet by mouth Daily for 90 days. 90 tablet 3   • naproxen (Naprosyn) 500 MG tablet Take 1 tablet with food as needed. 30 tablet 0   • tiZANidine (ZANAFLEX) 4 MG tablet Take 1 tablet by mouth At Night As Needed for Muscle Spasms for up to 90 days. 90 tablet 1     No current facility-administered medications on file prior to visit.        Health Maintenance Due   Topic Date Due   • DXA SCAN  Never done   • COVID-19 Vaccine (1) Never done   • TDAP/TD VACCINES (1 - Tdap) Never done   • ZOSTER VACCINE (1 of 2) Never done   • Pneumococcal Vaccine 65+ (1 of 1 - PPSV23) Never done   • HEPATITIS C SCREENING  Never done   • INFLUENZA VACCINE  Never done   • ANNUAL WELLNESS VISIT  08/21/2021       Vital Signs:   There were no vitals filed for this visit.   There is no height or weight on file to calculate BMI.     ROS:  Review of Systems   Constitutional: Negative for fatigue and fever.   HENT: Negative for congestion, ear pain and sinus pressure.    Respiratory: Negative for cough, chest tightness and shortness of breath.    Cardiovascular: Negative for chest pain, palpitations and leg swelling.   Gastrointestinal: Negative for abdominal pain and diarrhea.   Genitourinary: Negative for dysuria and frequency.   Neurological: Negative for speech difficulty, headache and confusion.   Psychiatric/Behavioral: Negative for agitation and behavioral problems.          Physical Exam  Constitutional:       Appearance: Normal appearance.   HENT:      Nose: Nose normal.   Eyes:      Extraocular Movements: Extraocular movements intact.   Pulmonary:      Effort: Pulmonary effort is normal.   Neurological:      Mental Status: She is alert and oriented to person, place, and time. Mental status is at baseline.   Psychiatric:         Mood and Affect: Mood normal.         Thought Content: Thought content normal.         Judgment: Judgment normal.          Result Review     Magnesium (11/08/2021 09:37)  Comprehensive Metabolic Panel (11/08/2021 09:37)  Iron Profile (11/08/2021 09:37)  CBC & Differential (11/08/2021 09:37)       Diagnoses and all orders for this visit:    1. Breast cancer screening by mammogram (Primary)  -     Mammo Screening Digital Tomosynthesis Bilateral With CAD; Future    2. Osteoarthritis of right hip, unspecified osteoarthritis type  -     Discontinue: traMADol (ULTRAM) 50 MG tablet; Take 1 tablet by mouth Every 8 (Eight) Hours As Needed for Moderate Pain  for up to 90 days.  Dispense: 90 tablet; Refill: 0  -     traMADol (ULTRAM) 50 MG tablet; Take 1 tablet by mouth Every 8 (Eight) Hours As Needed for Moderate Pain  for up to 90 days.  Dispense: 90 tablet; Refill: 0          Smoking Cessation:    Suzette Wilson  reports that she has never smoked. She has never used smokeless tobacco.          Follow Up   Return in about 3 months (around 3/21/2022).  Patient was given instructions and counseling regarding her condition or for health maintenance advice. Please see specific information pulled into the AVS if appropriate.       Isis Ordonez MD

## 2021-12-29 ENCOUNTER — HOSPITAL ENCOUNTER (OUTPATIENT)
Dept: MAMMOGRAPHY | Facility: HOSPITAL | Age: 71
Discharge: HOME | End: 2021-12-29
Admitting: FAMILY MEDICINE

## 2021-12-29 DIAGNOSIS — Z12.31 BREAST CANCER SCREENING BY MAMMOGRAM: ICD-10-CM

## 2021-12-29 PROCEDURE — 77067 SCR MAMMO BI INCL CAD: CPT

## 2021-12-29 PROCEDURE — 77063 BREAST TOMOSYNTHESIS BI: CPT

## 2022-01-06 RX ORDER — MELOXICAM 15 MG/1
15 TABLET ORAL DAILY
Qty: 90 TABLET | Refills: 0 | Status: SHIPPED | OUTPATIENT
Start: 2022-01-06 | End: 2022-04-06

## 2022-01-14 ENCOUNTER — TELEPHONE (OUTPATIENT)
Dept: FAMILY MEDICINE CLINIC | Facility: CLINIC | Age: 72
End: 2022-01-14

## 2022-01-14 NOTE — TELEPHONE ENCOUNTER
Caller: Suzette Wilson    Relationship: Self    Best call back number: [phone]    Requested Prescriptions:   ANTIBIOTIC    Pharmacy where request should be sent:   [facility] [place] [facility] - [place] - [address] - [phone] [facility] [phone]   [phone]    Additional details provided by patient: PATIENT WILL BE LEAVING FOR [place] ON Thursday, 01/20/2022, AND SHE IS REQUESTING AN ANTIBIOTIC OR SOMETHING WILL HELP WITH POSSIBLE SINUS INFECTION. SHE STATES SHE IS HAVING SINUS PRESSURE ON THE RIGHT SIDE OF HER FACE AND HAS BEEN TAKING OVER THE COUNTER TYLENOL AND IT HAS BEEN HELPING, BUT SHE IS REQUESTING SOMETHING TO GO AHEAD AND KNOCK IT OUT BEFORE IT GETS TOO BAD.       Nimisha Gallegos, RegSched Rep   01/14/22 10:40 EST

## 2022-04-08 RX ORDER — RABEPRAZOLE SODIUM 20 MG/1
TABLET, DELAYED RELEASE ORAL
COMMUNITY
Start: 2022-01-02 | End: 2022-04-12 | Stop reason: SDUPTHER

## 2022-04-08 RX ORDER — MELOXICAM 15 MG/1
15 TABLET ORAL DAILY
Qty: 90 TABLET | Refills: 1 | Status: SHIPPED | OUTPATIENT
Start: 2022-04-08

## 2022-04-08 NOTE — TELEPHONE ENCOUNTER
Caller: Suzette Wilson    Relationship: Self    Best call back number: [phone]    Requested Prescriptions:   Requested Prescriptions     Pending Prescriptions Disp Refills   • conjugated estrogens (PREMARIN) 0.625 MG/GM vaginal cream 30 g 1     Sig: Insert  into the vagina Daily.      -MELOXICAM 15 MG       Pharmacy where request should be sent: [facility] [place] [facility] - [place] - [address] - [phone] [facility] [phone] FX     Additional details provided by patient: PATIENT CURRENTLY HAS 5 DAYS LEFT. SHE STATED THAT THERE ARE 0 REFILLS FOR THESE LEFT.     Does the patient have less than a 3 day supply:  [] Yes  [x] No    Khanh Mondragon Rep   04/08/22 12:46 EDT

## 2022-04-12 DIAGNOSIS — I10 ESSENTIAL HYPERTENSION: ICD-10-CM

## 2022-04-12 RX ORDER — LISINOPRIL 20 MG/1
20 TABLET ORAL DAILY
Qty: 90 TABLET | Refills: 3 | Status: SHIPPED | OUTPATIENT
Start: 2022-04-12 | End: 2022-05-09 | Stop reason: SDUPTHER

## 2022-04-12 RX ORDER — RABEPRAZOLE SODIUM 20 MG/1
20 TABLET, DELAYED RELEASE ORAL DAILY
Qty: 90 TABLET | Refills: 1 | Status: SHIPPED | OUTPATIENT
Start: 2022-04-12 | End: 2022-08-23 | Stop reason: SDUPTHER

## 2022-04-12 RX ORDER — METOPROLOL SUCCINATE 50 MG/1
50 TABLET, EXTENDED RELEASE ORAL DAILY
Qty: 90 TABLET | Refills: 3 | Status: SHIPPED | OUTPATIENT
Start: 2022-04-12 | End: 2022-05-09 | Stop reason: SDUPTHER

## 2022-04-12 NOTE — TELEPHONE ENCOUNTER
Caller: Suzette Wilson LIZA    Relationship: Self    Best call back number: [phone]    Requested Prescriptions:   Requested Prescriptions     Pending Prescriptions Disp Refills   • lisinopril (PRINIVIL,ZESTRIL) 20 MG tablet 90 tablet 3     Sig: Take 1 tablet by mouth Daily for 90 days.   • RABEprazole (ACIPHEX) 20 MG EC tablet     • metoprolol succinate XL (TOPROL-XL) 50 MG 24 hr tablet 90 tablet 3     Sig: Take 1 tablet by mouth Daily for 90 days.        [facility] be sent[facility]  [place] - [address] [facility] [facility] - [phone] [facility] [phone] FX     PATIENT WOULD LIKE A CALL BACK TO LET HER KNOW THIS HAS BEEN SENT TO THE PHARMACY PLEASE ADVISE THANK YOU.       Does the patient have less than a 3 day supply:  [] Yes  [x] No    Khanh Malin Rep   04/12/22 14:21 EDT

## 2022-04-18 ENCOUNTER — TELEPHONE (OUTPATIENT)
Dept: FAMILY MEDICINE CLINIC | Facility: CLINIC | Age: 72
End: 2022-04-18

## 2022-04-18 NOTE — TELEPHONE ENCOUNTER
Caller: NURYS    Relationship:     Best call back number: [phone]    What is the best time to reach you: ANY    Who are you requesting to speak with (clinical staff, provider,  specific staff member): CLINICAL     What was the call regarding: NURYS STATED: ON 4/15/22 MESSAGE WAS SENT FOR CLARIFICATION AND SAME DIRECTIONS WERE SENT.  NEEDING TO KNOW THE DOSAGE, IS THIS A TITRATION OR CYCLICAL DOSING, DURATION, FOR PREMARIN VAGINAL CREAM     Do you require a callback:YES   
no

## 2022-05-09 ENCOUNTER — OFFICE VISIT (OUTPATIENT)
Dept: FAMILY MEDICINE CLINIC | Facility: CLINIC | Age: 72
End: 2022-05-09

## 2022-05-09 VITALS
RESPIRATION RATE: 14 BRPM | TEMPERATURE: 97.8 F | HEART RATE: 60 BPM | SYSTOLIC BLOOD PRESSURE: 138 MMHG | DIASTOLIC BLOOD PRESSURE: 82 MMHG | OXYGEN SATURATION: 100 % | WEIGHT: 175 LBS | HEIGHT: 63 IN | BODY MASS INDEX: 31.01 KG/M2

## 2022-05-09 VITALS
TEMPERATURE: 97.8 F | BODY MASS INDEX: 31.15 KG/M2 | RESPIRATION RATE: 14 BRPM | WEIGHT: 175.8 LBS | OXYGEN SATURATION: 100 % | DIASTOLIC BLOOD PRESSURE: 82 MMHG | HEART RATE: 60 BPM | SYSTOLIC BLOOD PRESSURE: 138 MMHG | HEIGHT: 63 IN

## 2022-05-09 DIAGNOSIS — I10 ESSENTIAL HYPERTENSION: ICD-10-CM

## 2022-05-09 DIAGNOSIS — N95.1 VAGINAL DRYNESS, MENOPAUSAL: Primary | ICD-10-CM

## 2022-05-09 DIAGNOSIS — Z00.00 ENCOUNTER FOR SUBSEQUENT ANNUAL WELLNESS VISIT (AWV) IN MEDICARE PATIENT: ICD-10-CM

## 2022-05-09 DIAGNOSIS — I10 PRIMARY HYPERTENSION: Primary | ICD-10-CM

## 2022-05-09 LAB
ALBUMIN SERPL-MCNC: 4.3 G/DL (ref 3.5–5.2)
ALBUMIN/GLOB SERPL: 1.9 G/DL
ALP SERPL-CCNC: 56 U/L (ref 39–117)
ALT SERPL W P-5'-P-CCNC: 12 U/L (ref 1–41)
ANION GAP SERPL CALCULATED.3IONS-SCNC: 9 MMOL/L (ref 5–15)
AST SERPL-CCNC: 24 U/L (ref 1–40)
BILIRUB SERPL-MCNC: 0.4 MG/DL (ref 0–1.2)
BUN SERPL-MCNC: 21 MG/DL (ref 8–23)
BUN/CREAT SERPL: 28 (ref 7–25)
CALCIUM SPEC-SCNC: 9.4 MG/DL (ref 8.6–10.5)
CHLORIDE SERPL-SCNC: 105 MMOL/L (ref 98–107)
CO2 SERPL-SCNC: 24 MMOL/L (ref 22–29)
CREAT SERPL-MCNC: 0.75 MG/DL (ref 0.76–1.27)
EGFRCR SERPLBLD CKD-EPI 2021: 95.9 ML/MIN/1.73
GLOBULIN UR ELPH-MCNC: 2.3 GM/DL
GLUCOSE SERPL-MCNC: 83 MG/DL (ref 65–99)
POTASSIUM SERPL-SCNC: 4.6 MMOL/L (ref 3.5–5.2)
PROT SERPL-MCNC: 6.6 G/DL (ref 6–8.5)
SODIUM SERPL-SCNC: 138 MMOL/L (ref 136–145)

## 2022-05-09 PROCEDURE — 1170F FXNL STATUS ASSESSED: CPT | Performed by: FAMILY MEDICINE

## 2022-05-09 PROCEDURE — 80053 COMPREHEN METABOLIC PANEL: CPT | Performed by: FAMILY MEDICINE

## 2022-05-09 PROCEDURE — 36415 COLL VENOUS BLD VENIPUNCTURE: CPT | Performed by: FAMILY MEDICINE

## 2022-05-09 PROCEDURE — 1159F MED LIST DOCD IN RCRD: CPT | Performed by: FAMILY MEDICINE

## 2022-05-09 PROCEDURE — 99214 OFFICE O/P EST MOD 30 MIN: CPT | Performed by: FAMILY MEDICINE

## 2022-05-09 PROCEDURE — G0439 PPPS, SUBSEQ VISIT: HCPCS | Performed by: FAMILY MEDICINE

## 2022-05-09 RX ORDER — METOPROLOL SUCCINATE 50 MG/1
50 TABLET, EXTENDED RELEASE ORAL DAILY
Qty: 90 TABLET | Refills: 3 | Status: SHIPPED | OUTPATIENT
Start: 2022-05-09 | End: 2022-11-01 | Stop reason: SDUPTHER

## 2022-05-09 RX ORDER — ESTROGEN,CON/M-PROGEST ACET 0.3-1.5MG
1 TABLET ORAL DAILY
Qty: 90 TABLET | Refills: 1 | Status: SHIPPED | OUTPATIENT
Start: 2022-05-09 | End: 2022-11-01 | Stop reason: SDUPTHER

## 2022-05-09 RX ORDER — LISINOPRIL 20 MG/1
20 TABLET ORAL DAILY
Qty: 90 TABLET | Refills: 3 | Status: SHIPPED | OUTPATIENT
Start: 2022-05-09 | End: 2022-11-01 | Stop reason: SDUPTHER

## 2022-05-09 NOTE — PROGRESS NOTES
Chief Complaint  Chief Complaint   Patient presents with   • Anemia   • Hypertension       HPI:  Suzette Wilson presents to [facility]     HTN- Pt BP today is 138/82 which is well controlled.  Pt is compliant with their medication and will continue their current medication regimen. No side effects to the medication.    Vaginal dryness and postmenopausal hormone changes-patient reports that she notes that she has been more tearful lately and feels this though she may benefit from having a hormone replacement therapy.  She also is currently taking the Premarin cream for vaginal dryness.  We will be switching her over to Prempro which is a pill that should help with the emotional changes associated with menopause but also help with some of the vaginal dryness.      Past History:  Medical History: has a past medical history of Acid reflux, Hemorrhoids, Hypertension, Medial meniscus tear (08/11/2016), and Osteoarthritis of left knee (08/24/2016).   Surgical History: has a past surgical history that includes Colonoscopy; Cystocele repair; Facelift; Esophagogastroduodenoscopy (2017); Rectal surgery; and Tonsillectomy (1968).   Family History: family history includes Alzheimer's disease in his brother and sister; Stroke in his father and mother.   Social History: reports that he has never smoked. He has never used smokeless tobacco. He reports current alcohol use. He reports that he does not use drugs.    There is no immunization history on file for this patient.      Allergies: Patient has no known allergies.     Medications:  Current Outpatient Medications on File Prior to Visit   Medication Sig Dispense Refill   • Magnesium 250 MG tablet magnesium 250 mg oral tablet take 1 tablet by oral route once a day (at bedtime)   Active     • melatonin 3 MG tablet melatonin 3 mg oral tablet take 1 tablet by oral route once a day (at bedtime)   Suspended     • meloxicam (MOBIC) 15 MG tablet Take 1  "tablet by mouth Daily. 90 tablet 1   • naproxen (Naprosyn) 500 MG tablet Take 1 tablet with food as needed. 30 tablet 0   • RABEprazole (ACIPHEX) 20 MG EC tablet Take 1 tablet by mouth Daily. 90 tablet 1   • [DISCONTINUED] conjugated estrogens (PREMARIN) 0.625 MG/GM vaginal cream Insert  into the vagina Daily. 30 g 1   • [DISCONTINUED] lisinopril (PRINIVIL,ZESTRIL) 20 MG tablet Take 1 tablet by mouth Daily for 90 days. 90 tablet 3   • [DISCONTINUED] metoprolol succinate XL (TOPROL-XL) 50 MG 24 hr tablet Take 1 tablet by mouth Daily for 90 days. 90 tablet 3     No current facility-administered medications on file prior to visit.        Health Maintenance Due   Topic Date Due   • COVID-19 Vaccine (1) Never done   • TDAP/TD VACCINES (1 - Tdap) Never done   • ZOSTER VACCINE (1 of 2) Never done   • Pneumococcal Vaccine 65+ (1 - PCV) Never done   • HEPATITIS C SCREENING  Never done       Vital Signs:   Vitals:    05/09/22 0909   BP: 138/82   Pulse: 60   Resp: 14   Temp: 97.8 °F (36.6 °C)   SpO2: 100%   Weight: 79.4 kg (175 lb)   Height: 160 cm (63\")      Body mass index is 31 kg/m².     ROS:  Review of Systems   Constitutional: Negative for fatigue and fever.   HENT: Negative for congestion and ear pain.    Eyes: Negative for blurred vision and discharge.   Respiratory: Negative for cough and shortness of breath.    Cardiovascular: Negative for chest pain, palpitations and leg swelling.   Gastrointestinal: Negative for abdominal distention, abdominal pain, constipation and diarrhea.   Genitourinary: Negative for difficulty urinating and dysuria.   Musculoskeletal: Negative for back pain and gait problem.   Skin: Negative for rash and skin lesions.   Neurological: Negative for headache, memory problem and confusion.   Psychiatric/Behavioral: Negative for behavioral problems and depressed mood.          Physical Exam  Vitals reviewed.   Constitutional:       Appearance: Normal appearance.   HENT:      Head: Normocephalic. "      Right Ear: Tympanic membrane normal.      Left Ear: Tympanic membrane normal.      Nose: Nose normal.      Mouth/Throat:      Mouth: Mucous membranes are moist.   Eyes:      Extraocular Movements: Extraocular movements intact.      Conjunctiva/sclera: Conjunctivae normal.      Pupils: Pupils are equal, round, and reactive to light.   Cardiovascular:      Rate and Rhythm: Normal rate and regular rhythm.      Pulses: Normal pulses.      Heart sounds: Normal heart sounds.   Pulmonary:      Effort: Pulmonary effort is normal.      Breath sounds: Normal breath sounds.   Abdominal:      General: Bowel sounds are normal.      Palpations: Abdomen is soft.   Musculoskeletal:         General: Normal range of motion.      Cervical back: Normal range of motion.   Skin:     General: Skin is warm and dry.   Neurological:      General: No focal deficit present.      Mental Status: He is alert and oriented to person, place, and time.   Psychiatric:         Mood and Affect: Mood normal.         Behavior: Behavior normal.          Result Review   Comprehensive Metabolic Panel (11/08/2021 09:37)       Diagnoses and all orders for this visit:    1. Vaginal dryness, menopausal (Primary)  -     estrogen, conjugated,-medroxyprogesterone (Prempro) 0.3-1.5 MG per tablet; Take 1 tablet by mouth Daily for 90 days.  Dispense: 90 tablet; Refill: 1    2. Essential hypertension  -     metoprolol succinate XL (TOPROL-XL) 50 MG 24 hr tablet; Take 1 tablet by mouth Daily for 90 days.  Dispense: 90 tablet; Refill: 3  -     lisinopril (PRINIVIL,ZESTRIL) 20 MG tablet; Take 1 tablet by mouth Daily for 90 days.  Dispense: 90 tablet; Refill: 3          Smoking Cessation:    Suzette Wilson  reports that he has never smoked. He has never used smokeless tobacco.          Follow Up   Return in about 3 months (around 8/9/2022).  Patient was given instructions and counseling regarding his condition or for health maintenance advice. Please see specific  information pulled into the AVS if appropriate.       Isis Ordonez MD

## 2022-05-09 NOTE — PROGRESS NOTES
The ABCs of the Annual Wellness Visit  Subsequent Medicare Wellness Visit    Chief Complaint   Patient presents with   • Annual Exam     Medicare wellness       Subjective    History of Present Illness:  Suzette Wilson is a 71 y.o. male who presents for a Subsequent Medicare Wellness Visit.    The following portions of the patient's history were reviewed and   updated as appropriate: allergies, current medications, past family history, past medical history, past social history, past surgical history and problem list.    Compared to one year ago, the patient feels his physical   health is the same.    Compared to one year ago, the patient feels his mental   health is the same.    Recent Hospitalizations:  He was not admitted to the hospital during the last year.       Current Medical Providers:  Patient Care Team:  Isis Ordonez MD as PCP - General (Family Medicine)    Outpatient Medications Prior to Visit   Medication Sig Dispense Refill   • Magnesium 250 MG tablet magnesium 250 mg oral tablet take 1 tablet by oral route once a day (at bedtime)   Active     • melatonin 3 MG tablet melatonin 3 mg oral tablet take 1 tablet by oral route once a day (at bedtime)   Suspended     • meloxicam (MOBIC) 15 MG tablet Take 1 tablet by mouth Daily. 90 tablet 1   • naproxen (Naprosyn) 500 MG tablet Take 1 tablet with food as needed. 30 tablet 0   • RABEprazole (ACIPHEX) 20 MG EC tablet Take 1 tablet by mouth Daily. 90 tablet 1   • conjugated estrogens (PREMARIN) 0.625 MG/GM vaginal cream Insert  into the vagina Daily. 30 g 1   • lisinopril (PRINIVIL,ZESTRIL) 20 MG tablet Take 1 tablet by mouth Daily for 90 days. 90 tablet 3   • metoprolol succinate XL (TOPROL-XL) 50 MG 24 hr tablet Take 1 tablet by mouth Daily for 90 days. 90 tablet 3     No facility-administered medications prior to visit.       No opioid medication identified on active medication list. I have reviewed chart for other potential  high risk medication/s and  "harmful drug interactions in the elderly.          Aspirin is not on active medication list.  Aspirin use is indicated based on review of current medical condition/s. Pros and cons of this therapy have been discussed with this patient. Benefits of this medication outweigh potential harm.  Patient has been instructed to start taking this medication..    Patient Active Problem List   Diagnosis   • Esophageal reflux   • Hypertension   • Urge incontinence   • Memory problem   • Anemia   • Sciatic nerve disease, right   • Mood swings   • Muscle spasms of both lower extremities     Advance Care Planning  Advance Directive is not on file.  ACP discussion was held with the patient during this visit. Patient has an advance directive (not in EMR), copy requested.  Pt reports she has an Will but we discussed having a living will which she did not have so I gave her the Jew handout and she will fill out and bring back for us to copy.        Objective    Vitals:    05/09/22 0901   BP: 138/82   BP Location: Left arm   Pulse: 60   Resp: 14   Temp: 97.8 °F (36.6 °C)   SpO2: 100%   Weight: 79.7 kg (175 lb 12.8 oz)   Height: 160 cm (63\")   PainSc: 0-No pain     BMI Readings from Last 1 Encounters:   05/09/22 31.14 kg/m²   BMI is above normal parameters. Recommendations include: nutrition counseling    Does the patient have evidence of cognitive impairment? No    Physical Exam  Vitals reviewed.   Constitutional:       Appearance: Normal appearance.   HENT:      Head: Normocephalic.      Right Ear: Tympanic membrane normal.      Left Ear: Tympanic membrane normal.      Nose: Nose normal.      Mouth/Throat:      Mouth: Mucous membranes are moist.   Eyes:      Extraocular Movements: Extraocular movements intact.      Conjunctiva/sclera: Conjunctivae normal.      Pupils: Pupils are equal, round, and reactive to light.   Cardiovascular:      Rate and Rhythm: Normal rate and regular rhythm.      Pulses: Normal pulses.      Heart sounds: " Normal heart sounds.   Pulmonary:      Effort: Pulmonary effort is normal.      Breath sounds: Normal breath sounds.   Abdominal:      General: Bowel sounds are normal.      Palpations: Abdomen is soft.   Musculoskeletal:         General: Normal range of motion.      Cervical back: Normal range of motion.   Skin:     General: Skin is warm and dry.   Neurological:      General: No focal deficit present.      Mental Status: He is alert and oriented to person, place, and time.   Psychiatric:         Mood and Affect: Mood normal.         Behavior: Behavior normal.                 HEALTH RISK ASSESSMENT    Smoking Status:  Social History     Tobacco Use   Smoking Status Never Smoker   Smokeless Tobacco Never Used     Alcohol Consumption:  Social History     Substance and Sexual Activity   Alcohol Use Yes     Fall Risk Screen:    [facility] Fall Risk Assessment was completed, and patient is at LOW risk for falls.Assessment completed on:5/9/2022    Depression Screening:  PHQ-2/PHQ-9 Depression Screening 5/9/2022   Retired PHQ-9 Total Score -   Retired Total Score -   Little Interest or Pleasure in Doing Things 0-->not at all   Feeling Down, Depressed or Hopeless 0-->not at all   PHQ-9: Brief Depression Severity Measure Score 0       Health Habits and Functional and Cognitive Screening:  Functional & Cognitive Status 5/9/2022   Do you have difficulty preparing food and eating? No   Do you have difficulty bathing yourself, getting dressed or grooming yourself? No   Do you have difficulty using the toilet? No   Do you have difficulty moving around from place to place? No   Do you have trouble with steps or getting out of a bed or a chair? No   Current Diet Well Balanced Diet   Dental Exam Unknown   Eye Exam Other   Exercise (times per week) 0 times per week   Current Exercises Include No Regular Exercise   Do you need help using the phone?  No   Are you deaf or do you have serious difficulty hearing?  No   Do you need help with  transportation? No   Do you need help shopping? No   Do you need help preparing meals?  No   Do you need help with housework?  No   Do you need help with laundry? No   Do you need help taking your medications? No   Do you need help managing money? No   Do you ever drive or ride in a car without wearing a seat belt? No       Age-appropriate Screening Schedule:  Refer to the list below for future screening recommendations based on patient's age, sex and/or medical conditions. Orders for these recommended tests are listed in the plan section. The patient has been provided with a written plan.    Health Maintenance   Topic Date Due   • TDAP/TD VACCINES (1 - Tdap) Never done   • ZOSTER VACCINE (1 of 2) Never done   • INFLUENZA VACCINE  08/01/2022              Assessment/Plan   CMS Preventative Services Quick Reference  Risk Factors Identified During Encounter  Depression/Dysphoria  The above risks/problems have been discussed with the patient.  Follow up actions/plans if indicated are seen below in the Assessment/Plan Section.  Pertinent information has been shared with the patient in the After Visit Summary.    Diagnoses and all orders for this visit:    1. Primary hypertension (Primary)  -     Comprehensive Metabolic Panel    2. Encounter for subsequent annual wellness visit (AWV) in Medicare patient        Follow Up:   Return in about 1 year (around 5/9/2023).     An After Visit Summary and PPPS were made available to the patient.      I spent 35 minutes caring for Suzette on this date of service. This time includes time spent by me in the following activities:counseling and educating the patient/family/caregiver

## 2022-08-01 ENCOUNTER — OFFICE VISIT (OUTPATIENT)
Dept: FAMILY MEDICINE CLINIC | Facility: CLINIC | Age: 72
End: 2022-08-01

## 2022-08-01 VITALS
WEIGHT: 179.6 LBS | SYSTOLIC BLOOD PRESSURE: 138 MMHG | DIASTOLIC BLOOD PRESSURE: 78 MMHG | TEMPERATURE: 98.2 F | HEART RATE: 60 BPM | BODY MASS INDEX: 31.82 KG/M2 | OXYGEN SATURATION: 96 % | HEIGHT: 63 IN

## 2022-08-01 DIAGNOSIS — R45.86 MOOD SWINGS: ICD-10-CM

## 2022-08-01 DIAGNOSIS — B35.1 ONYCHOMYCOSIS: Primary | ICD-10-CM

## 2022-08-01 DIAGNOSIS — I10 PRIMARY HYPERTENSION: ICD-10-CM

## 2022-08-01 PROCEDURE — 99214 OFFICE O/P EST MOD 30 MIN: CPT | Performed by: FAMILY MEDICINE

## 2022-08-01 NOTE — PROGRESS NOTES
Chief Complaint  Chief Complaint   Patient presents with   • Hypertension   • Right Toe       HPI:  Suzette Wilson presents to [facility]    HTN- Pt BP today is 138/78 which is well controlled.  Pt is compliant with their medication and will continue their current medication regimen. No side effects to the medication.    Right Toe- pt reports that about a year ago she hit her toe with her door at home and it lifted her toenail and she just pushed it back down but every since then it has not been growing well and she has noticed that it appears thicker than normal.  We will try to treat with ciclopirox and if no any better we will refer to Dermatology.    Mood swings- pt reports that she is feeling much better with prempro.    Past History:  Medical History: has a past medical history of Acid reflux, Hemorrhoids, Hypertension, Medial meniscus tear (08/11/2016), and Osteoarthritis of left knee (08/24/2016).   Surgical History: has a past surgical history that includes Colonoscopy; Cystocele repair; Facelift; Esophagogastroduodenoscopy (2017); Rectal surgery; and Tonsillectomy (1968).   Family History: family history includes Alzheimer's disease in her brother and sister; Stroke in her father and mother.   Social History: reports that she has never smoked. She has never used smokeless tobacco. She reports current alcohol use. She reports that she does not use drugs.    There is no immunization history on file for this patient.      Allergies: Patient has no known allergies.     Medications:  Current Outpatient Medications on File Prior to Visit   Medication Sig Dispense Refill   • estrogen, conjugated,-medroxyprogesterone (Prempro) 0.3-1.5 MG per tablet Take 1 tablet by mouth Daily for 90 days. 90 tablet 1   • lisinopril (PRINIVIL,ZESTRIL) 20 MG tablet Take 1 tablet by mouth Daily for 90 days. 90 tablet 3   • Magnesium 250 MG tablet magnesium 250 mg oral tablet take 1 tablet by oral  "route once a day (at bedtime)   Active     • melatonin 3 MG tablet melatonin 3 mg oral tablet take 1 tablet by oral route once a day (at bedtime)   Suspended     • meloxicam (MOBIC) 15 MG tablet Take 1 tablet by mouth Daily. 90 tablet 1   • metoprolol succinate XL (TOPROL-XL) 50 MG 24 hr tablet Take 1 tablet by mouth Daily for 90 days. 90 tablet 3   • naproxen (Naprosyn) 500 MG tablet Take 1 tablet with food as needed. 30 tablet 0   • RABEprazole (ACIPHEX) 20 MG EC tablet Take 1 tablet by mouth Daily. 90 tablet 1     No current facility-administered medications on file prior to visit.        Health Maintenance Due   Topic Date Due   • DXA SCAN  Never done   • COVID-19 Vaccine (1) Never done   • TDAP/TD VACCINES (1 - Tdap) Never done   • ZOSTER VACCINE (1 of 2) Never done   • Pneumococcal Vaccine 65+ (1 - PCV) Never done   • HEPATITIS C SCREENING  Never done       Vital Signs:   Vitals:    08/01/22 0922   BP: 138/78   BP Location: Left arm   Patient Position: Sitting   Cuff Size: Adult   Pulse: 60   Temp: 98.2 °F (36.8 °C)   SpO2: 96%   Weight: 81.5 kg (179 lb 9.6 oz)   Height: 160 cm (63\")      Body mass index is 31.81 kg/m².     ROS:  Review of Systems   Constitutional: Negative for fatigue and fever.   HENT: Negative for congestion, ear pain and sinus pressure.    Respiratory: Negative for cough, chest tightness and shortness of breath.    Cardiovascular: Negative for chest pain, palpitations and leg swelling.   Gastrointestinal: Negative for abdominal pain and diarrhea.   Genitourinary: Negative for dysuria and frequency.   Neurological: Negative for speech difficulty, headache and confusion.   Psychiatric/Behavioral: Negative for agitation and behavioral problems.          Physical Exam  Vitals reviewed.   Constitutional:       Appearance: Normal appearance.   HENT:      Right Ear: Tympanic membrane normal.      Left Ear: Tympanic membrane normal.      Nose: Nose normal.   Eyes:      Extraocular Movements: " Extraocular movements intact.      Conjunctiva/sclera: Conjunctivae normal.      Pupils: Pupils are equal, round, and reactive to light.   Cardiovascular:      Rate and Rhythm: Normal rate and regular rhythm.   Pulmonary:      Effort: Pulmonary effort is normal.      Breath sounds: Normal breath sounds.   Abdominal:      General: Bowel sounds are normal.   Musculoskeletal:         General: Normal range of motion.      Cervical back: Normal range of motion.   Skin:     General: Skin is warm and dry.   Neurological:      General: No focal deficit present.      Mental Status: She is alert and oriented to person, place, and time.   Psychiatric:         Mood and Affect: Mood normal.         Behavior: Behavior normal.          Result Review   Mammo Screening Digital Tomosynthesis Bilateral With CAD (12/29/2021 10:00)  Comprehensive Metabolic Panel (05/09/2022 09:47)       Diagnoses and all orders for this visit:    1. Onychomycosis (Primary)  -     ciclopirox (PENLAC) 8 % solution; Apply  topically to the appropriate area as directed Every Night for 30 days.  Dispense: 6 mL; Refill: 3    2. Primary hypertension    3. Mood swings          Smoking Cessation:    Suzette DE JESUS Katie  reports that she has never smoked. She has never used smokeless tobacco.          Follow Up   Return in about 3 months (around 11/1/2022).  Patient was given instructions and counseling regarding her condition or for health maintenance advice. Please see specific information pulled into the AVS if appropriate.       Isis Ordonez MD

## 2022-08-23 RX ORDER — RABEPRAZOLE SODIUM 20 MG/1
20 TABLET, DELAYED RELEASE ORAL DAILY
Qty: 90 TABLET | Refills: 1 | Status: SHIPPED | OUTPATIENT
Start: 2022-08-23 | End: 2023-01-06 | Stop reason: SDUPTHER

## 2022-08-23 NOTE — TELEPHONE ENCOUNTER
Caller: Suzette Wilson    Relationship: Self    Best call back number:[phone]     Requested Prescriptions:   Requested Prescriptions     Pending Prescriptions Disp Refills   • RABEprazole (ACIPHEX) 20 MG EC tablet 90 tablet 1     Sig: Take 1 tablet by mouth Daily.        Pharmacy where request should be sent: [facility], [place] [address] [phone] [facility] [phone] FX     Additional details provided by patient: PATIENT IS OUT OF MEDICATION    Does the patient have less than a 3 day supply:  [x] Yes  [] No    Khanh Tamez Rep   08/23/22 08:22 EDT

## 2022-11-01 ENCOUNTER — OFFICE VISIT (OUTPATIENT)
Dept: FAMILY MEDICINE CLINIC | Facility: CLINIC | Age: 72
End: 2022-11-01

## 2022-11-01 VITALS
HEIGHT: 63 IN | WEIGHT: 172 LBS | BODY MASS INDEX: 30.48 KG/M2 | OXYGEN SATURATION: 99 % | HEART RATE: 71 BPM | DIASTOLIC BLOOD PRESSURE: 72 MMHG | TEMPERATURE: 97.9 F | SYSTOLIC BLOOD PRESSURE: 128 MMHG

## 2022-11-01 DIAGNOSIS — I10 ESSENTIAL HYPERTENSION: ICD-10-CM

## 2022-11-01 DIAGNOSIS — N95.1 VAGINAL DRYNESS, MENOPAUSAL: ICD-10-CM

## 2022-11-01 DIAGNOSIS — R41.3 MEMORY PROBLEM: ICD-10-CM

## 2022-11-01 DIAGNOSIS — F32.A DEPRESSION, UNSPECIFIED DEPRESSION TYPE: Primary | ICD-10-CM

## 2022-11-01 PROCEDURE — 84436 ASSAY OF TOTAL THYROXINE: CPT | Performed by: FAMILY MEDICINE

## 2022-11-01 PROCEDURE — 84443 ASSAY THYROID STIM HORMONE: CPT | Performed by: FAMILY MEDICINE

## 2022-11-01 PROCEDURE — 84479 ASSAY OF THYROID (T3 OR T4): CPT | Performed by: FAMILY MEDICINE

## 2022-11-01 PROCEDURE — 36415 COLL VENOUS BLD VENIPUNCTURE: CPT | Performed by: FAMILY MEDICINE

## 2022-11-01 PROCEDURE — 99214 OFFICE O/P EST MOD 30 MIN: CPT | Performed by: FAMILY MEDICINE

## 2022-11-01 PROCEDURE — 82607 VITAMIN B-12: CPT | Performed by: FAMILY MEDICINE

## 2022-11-01 RX ORDER — METOPROLOL SUCCINATE 50 MG/1
50 TABLET, EXTENDED RELEASE ORAL DAILY
Qty: 90 TABLET | Refills: 3 | Status: SHIPPED | OUTPATIENT
Start: 2022-11-01 | End: 2023-01-30

## 2022-11-01 RX ORDER — LISINOPRIL 20 MG/1
20 TABLET ORAL DAILY
Qty: 90 TABLET | Refills: 3 | Status: SHIPPED | OUTPATIENT
Start: 2022-11-01 | End: 2023-01-30

## 2022-11-01 RX ORDER — ESTROGEN,CON/M-PROGEST ACET 0.3-1.5MG
1 TABLET ORAL DAILY
Qty: 90 TABLET | Refills: 1 | Status: SHIPPED | OUTPATIENT
Start: 2022-11-01 | End: 2023-01-30

## 2022-11-01 NOTE — PROGRESS NOTES
Answers for HPI/ROS submitted by the patient on 10/31/2022  Please describe your symptoms.: routine/follow-up, schedule appt.  Have you had these symptoms before?: Yes  How long have you been having these symptoms?: Greater than 2 weeks  Please list any medications you are currently taking for this condition.: Prempro 1.5 gm, tramadol 5mg, lisinopril 20mg, rabeprazole 20mg, metoprolol succ, 50mg  Please describe any probable cause for these symptoms. : soreness throughout body and confusion/memory issues.  What is the primary reason for your visit?: Other    Chief Complaint  Chief Complaint   Patient presents with   • Memory, frustration and emotional about once a month       HPI:  Suzette Wilson presents to [facility]    Pt reports she has been having memory loss and she forgets conversations that she has with her .     Pt reports she has been more emotional and at times feels like she wants to cry.     We performed a Mini mental status exam in the office today and she scored a 29/30.  I reassured her that she is not showing signs of dementia but may be expressing symptoms of Depression which she agreed may be right.  Pt reports she just feels like she is standing still in life.     HTN- Pt BP today is 128/72 which is well controlled.  Pt is compliant with their medication and will continue their current medication regimen. No side effects to the medication.        Past History:  Medical History: has a past medical history of Acid reflux, Hemorrhoids, Hypertension, Medial meniscus tear (08/11/2016), and Osteoarthritis of left knee (08/24/2016).   Surgical History: has a past surgical history that includes Colonoscopy; Cystocele repair; Facelift; Esophagogastroduodenoscopy (2017); Rectal surgery; and Tonsillectomy (1968).   Family History: family history includes Alzheimer's disease in her brother and sister; Stroke in her father and mother.   Social History: reports that  "she has never smoked. She has never been exposed to tobacco smoke. She has never used smokeless tobacco. She reports current alcohol use. She reports that she does not use drugs.    There is no immunization history on file for this patient.      Allergies: Patient has no known allergies.     Medications:  Current Outpatient Medications on File Prior to Visit   Medication Sig Dispense Refill   • Magnesium 250 MG tablet magnesium 250 mg oral tablet take 1 tablet by oral route once a day (at bedtime)   Active     • melatonin 3 MG tablet melatonin 3 mg oral tablet take 1 tablet by oral route once a day (at bedtime)   Suspended     • meloxicam (MOBIC) 15 MG tablet Take 1 tablet by mouth Daily. 90 tablet 1   • naproxen (Naprosyn) 500 MG tablet Take 1 tablet with food as needed. 30 tablet 0   • RABEprazole (ACIPHEX) 20 MG EC tablet Take 1 tablet by mouth Daily. 90 tablet 1   • [DISCONTINUED] estrogen, conjugated,-medroxyprogesterone (Prempro) 0.3-1.5 MG per tablet Take 1 tablet by mouth Daily for 90 days. 90 tablet 1   • [DISCONTINUED] lisinopril (PRINIVIL,ZESTRIL) 20 MG tablet Take 1 tablet by mouth Daily for 90 days. 90 tablet 3   • [DISCONTINUED] metoprolol succinate XL (TOPROL-XL) 50 MG 24 hr tablet Take 1 tablet by mouth Daily for 90 days. 90 tablet 3   • [DISCONTINUED] RABEprazole Sodium (ACIPHEX PO) Take 20 mg by mouth Daily.       No current facility-administered medications on file prior to visit.        Health Maintenance Due   Topic Date Due   • DXA SCAN  Never done   • COVID-19 Vaccine (1) Never done   • ZOSTER VACCINE (1 of 2) Never done   • Pneumococcal Vaccine 65+ (1 - PCV) Never done   • HEPATITIS C SCREENING  Never done   • INFLUENZA VACCINE  Never done       Vital Signs:   Vitals:    11/01/22 1058   BP: 128/72   Pulse: 71   Temp: 97.9 °F (36.6 °C)   SpO2: 99%   Weight: 78 kg (172 lb)   Height: 160 cm (63\")      Body mass index is 30.47 kg/m².     ROS:  Review of Systems   Constitutional: Negative for " fatigue and fever.   HENT: Negative for congestion, ear pain and sinus pressure.    Respiratory: Negative for cough, chest tightness and shortness of breath.    Cardiovascular: Negative for chest pain, palpitations and leg swelling.   Gastrointestinal: Negative for abdominal pain and diarrhea.   Genitourinary: Negative for dysuria and frequency.   Neurological: Positive for memory problem. Negative for speech difficulty, headache and confusion.   Psychiatric/Behavioral: Negative for agitation and behavioral problems.          Physical Exam  Vitals reviewed.   Constitutional:       Appearance: Normal appearance.   HENT:      Right Ear: Tympanic membrane normal.      Left Ear: Tympanic membrane normal.      Nose: Nose normal.   Eyes:      Extraocular Movements: Extraocular movements intact.      Conjunctiva/sclera: Conjunctivae normal.      Pupils: Pupils are equal, round, and reactive to light.   Cardiovascular:      Rate and Rhythm: Normal rate and regular rhythm.   Pulmonary:      Effort: Pulmonary effort is normal.      Breath sounds: Normal breath sounds.   Abdominal:      General: Bowel sounds are normal.   Musculoskeletal:         General: Normal range of motion.      Cervical back: Normal range of motion.   Skin:     General: Skin is warm and dry.   Neurological:      General: No focal deficit present.      Mental Status: She is alert and oriented to person, place, and time. Mental status is at baseline.   Psychiatric:         Mood and Affect: Mood normal.         Behavior: Behavior normal.         Thought Content: Thought content normal.         Judgment: Judgment normal.          Result Review   Comprehensive Metabolic Panel (05/09/2022 09:47)  THERAPIES - SCAN - Mini-Mental State Examination (11/01/2022)       Diagnoses and all orders for this visit:    1. Depression, unspecified depression type (Primary)  -     Thyroid Panel With TSH  -     Vitamin B12    2. Essential hypertension  -     lisinopril  (PRINIVIL,ZESTRIL) 20 MG tablet; Take 1 tablet by mouth Daily for 90 days.  Dispense: 90 tablet; Refill: 3  -     metoprolol succinate XL (TOPROL-XL) 50 MG 24 hr tablet; Take 1 tablet by mouth Daily for 90 days.  Dispense: 90 tablet; Refill: 3    3. Vaginal dryness, menopausal  -     estrogen, conjugated,-medroxyprogesterone (Prempro) 0.3-1.5 MG per tablet; Take 1 tablet by mouth Daily for 90 days.  Dispense: 90 tablet; Refill: 1    4. Memory problem  -     Thyroid Panel With TSH  -     Vitamin B12          Smoking Cessation:    Suzette Wilson  reports that she has never smoked. She has never been exposed to tobacco smoke. She has never used smokeless tobacco.          Follow Up   Return in about 3 months (around 2/1/2023).  Patient was given instructions and counseling regarding her condition or for health maintenance advice. Please see specific information pulled into the AVS if appropriate.       Isis Ordonez MD

## 2022-11-02 LAB
T-UPTAKE NFR SERPL: 0.9 TBI (ref 0.8–1.3)
T4 SERPL-MCNC: 8.24 MCG/DL (ref 4.5–11.7)
TSH SERPL DL<=0.05 MIU/L-ACNC: 0.98 UIU/ML (ref 0.27–4.2)
VIT B12 BLD-MCNC: 518 PG/ML (ref 211–946)

## 2022-12-01 ENCOUNTER — OFFICE VISIT (OUTPATIENT)
Dept: FAMILY MEDICINE CLINIC | Facility: CLINIC | Age: 72
End: 2022-12-01

## 2022-12-01 VITALS
WEIGHT: 172 LBS | OXYGEN SATURATION: 98 % | BODY MASS INDEX: 30.48 KG/M2 | HEART RATE: 70 BPM | HEIGHT: 63 IN | DIASTOLIC BLOOD PRESSURE: 70 MMHG | TEMPERATURE: 98 F | SYSTOLIC BLOOD PRESSURE: 128 MMHG

## 2022-12-01 DIAGNOSIS — F33.8 SEASONAL AFFECTIVE DISORDER: ICD-10-CM

## 2022-12-01 DIAGNOSIS — R45.86 MOOD SWINGS: Primary | ICD-10-CM

## 2022-12-01 DIAGNOSIS — I10 PRIMARY HYPERTENSION: ICD-10-CM

## 2022-12-01 DIAGNOSIS — M79.661 RIGHT CALF PAIN: ICD-10-CM

## 2022-12-01 PROCEDURE — 99214 OFFICE O/P EST MOD 30 MIN: CPT | Performed by: FAMILY MEDICINE

## 2022-12-01 RX ORDER — BUPROPION HYDROCHLORIDE 150 MG/1
150 TABLET ORAL DAILY
Qty: 90 TABLET | Refills: 1 | Status: SHIPPED | OUTPATIENT
Start: 2022-12-01 | End: 2023-03-01

## 2022-12-01 NOTE — PROGRESS NOTES
Answers for HPI/ROS submitted by the patient on 11/25/2022  Please describe your symptoms.: follow up  Have you had these symptoms before?: Yes  How long have you been having these symptoms?: 1-4 days  Please list any medications you are currently taking for this condition.: all medication  Please describe any probable cause for these symptoms. : follow up  What is the primary reason for your visit?: Other    Chief Complaint  Chief Complaint   Patient presents with   • Right knee pain goes down leg   • Fatigue       HPI:  Suzette Wilson presents to [facility]     Pt reports she has a new pain behind her right knee and [place] her knee and now it hurts more. Pt may have a baker's cyst behind her knee.    Mood Swings- pt reports she has been having more mood swings but she feels the Bupropion does help.      HTN- Pt BP today is 128/70 which is well controlled.  Pt is compliant with their medication and will continue their current medication regimen. No side effects to the medication.    Past History:  Medical History: has a past medical history of Acid reflux, Hemorrhoids, Hypertension, Medial meniscus tear (08/11/2016), and Osteoarthritis of left knee (08/24/2016).   Surgical History: has a past surgical history that includes Colonoscopy; Cystocele repair; Facelift; Esophagogastroduodenoscopy (2017); Rectal surgery; and Tonsillectomy (1968).   Family History: family history includes Alzheimer's disease in her brother and sister; Stroke in her father and mother.   Social History: reports that she has never smoked. She has never been exposed to tobacco smoke. She has never used smokeless tobacco. She reports current alcohol use. She reports that she does not use drugs.    There is no immunization history on file for this patient.      Allergies: Patient has no known allergies.     Medications:  Current Outpatient Medications on File Prior  "to Visit   Medication Sig Dispense Refill   • estrogen, conjugated,-medroxyprogesterone (Prempro) 0.3-1.5 MG per tablet Take 1 tablet by mouth Daily for 90 days. 90 tablet 1   • lisinopril (PRINIVIL,ZESTRIL) 20 MG tablet Take 1 tablet by mouth Daily for 90 days. 90 tablet 3   • Magnesium 250 MG tablet magnesium 250 mg oral tablet take 1 tablet by oral route once a day (at bedtime)   Active     • meloxicam (MOBIC) 15 MG tablet Take 1 tablet by mouth Daily. 90 tablet 1   • metoprolol succinate XL (TOPROL-XL) 50 MG 24 hr tablet Take 1 tablet by mouth Daily for 90 days. 90 tablet 3   • naproxen (Naprosyn) 500 MG tablet Take 1 tablet with food as needed. 30 tablet 0   • RABEprazole (ACIPHEX) 20 MG EC tablet Take 1 tablet by mouth Daily. 90 tablet 1   • [DISCONTINUED] melatonin 3 MG tablet melatonin 3 mg oral tablet take 1 tablet by oral route once a day (at bedtime)   Suspended       No current facility-administered medications on file prior to visit.        Health Maintenance Due   Topic Date Due   • DXA SCAN  Never done   • COVID-19 Vaccine (1) Never done   • ZOSTER VACCINE (1 of 2) Never done   • Pneumococcal Vaccine 65+ (1 - PCV) Never done   • HEPATITIS C SCREENING  Never done   • INFLUENZA VACCINE  Never done       Vital Signs:   Vitals:    12/01/22 1358   BP: 128/70   Pulse: 70   Temp: 98 °F (36.7 °C)   SpO2: 98%   Weight: 78 kg (172 lb)   Height: 160 cm (63\")      Body mass index is 30.47 kg/m².     ROS:  Review of Systems   Constitutional: Negative for fatigue and fever.   HENT: Negative for congestion, ear pain and sinus pressure.    Respiratory: Negative for cough, chest tightness and shortness of breath.    Cardiovascular: Negative for chest pain, palpitations and leg swelling.   Gastrointestinal: Negative for abdominal pain and diarrhea.   Genitourinary: Negative for dysuria and frequency.   Neurological: Positive for memory problem. Negative for speech difficulty, headache and confusion. "   Psychiatric/Behavioral: Negative for agitation and behavioral problems.          Physical Exam  Vitals reviewed.   Constitutional:       Appearance: Normal appearance.   HENT:      Right Ear: Tympanic membrane normal.      Left Ear: Tympanic membrane normal.      Nose: Nose normal.   Eyes:      Extraocular Movements: Extraocular movements intact.      Conjunctiva/sclera: Conjunctivae normal.      Pupils: Pupils are equal, round, and reactive to light.   Cardiovascular:      Rate and Rhythm: Normal rate and regular rhythm.   Pulmonary:      Effort: Pulmonary effort is normal.      Breath sounds: Normal breath sounds.   Abdominal:      General: Bowel sounds are normal.   Musculoskeletal:         General: Normal range of motion.      Cervical back: Normal range of motion.   Skin:     General: Skin is warm and dry.   Neurological:      General: No focal deficit present.      Mental Status: She is alert and oriented to person, place, and time.   Psychiatric:         Mood and Affect: Mood normal.         Behavior: Behavior normal.          Result Review   Thyroid Panel With TSH (11/01/2022 12:20)  Vitamin B12 (11/01/2022 12:20)       Diagnoses and all orders for this visit:    1. Mood swings (Primary)  -     buPROPion XL (Wellbutrin XL) 150 MG 24 hr tablet; Take 1 tablet by mouth Daily for 90 days.  Dispense: 90 tablet; Refill: 1    2. Seasonal affective disorder (HCC)  -     buPROPion XL (Wellbutrin XL) 150 MG 24 hr tablet; Take 1 tablet by mouth Daily for 90 days.  Dispense: 90 tablet; Refill: 1    3. Right calf pain  -     Duplex Venous Lower Extremity - Right CAR; Future    4. Primary hypertension          Smoking Cessation:    Suzette Wilson  reports that she has never smoked. She has never been exposed to tobacco smoke. She has never used smokeless tobacco.        Follow Up   Return in about 4 weeks (around 12/29/2022).  Patient was given instructions and counseling regarding her condition or for health  maintenance advice. Please see specific information pulled into the AVS if appropriate.       Isis Ordonez MD

## 2022-12-02 ENCOUNTER — HOSPITAL ENCOUNTER (OUTPATIENT)
Dept: CARDIOLOGY | Facility: HOSPITAL | Age: 72
Discharge: HOME | End: 2022-12-02
Admitting: FAMILY MEDICINE

## 2022-12-02 DIAGNOSIS — M79.661 RIGHT CALF PAIN: ICD-10-CM

## 2022-12-02 DIAGNOSIS — M16.11 OSTEOARTHRITIS OF RIGHT HIP, UNSPECIFIED OSTEOARTHRITIS TYPE: Primary | ICD-10-CM

## 2022-12-02 DIAGNOSIS — M71.21 BAKER'S CYST, RIGHT: Primary | ICD-10-CM

## 2022-12-02 LAB
BH CV LOWER VASCULAR LEFT COMMON FEMORAL AUGMENT: NORMAL
BH CV LOWER VASCULAR LEFT COMMON FEMORAL COMPETENT: NORMAL
BH CV LOWER VASCULAR LEFT COMMON FEMORAL COMPRESS: NORMAL
BH CV LOWER VASCULAR LEFT COMMON FEMORAL PHASIC: NORMAL
BH CV LOWER VASCULAR LEFT COMMON FEMORAL SPONT: NORMAL
BH CV LOWER VASCULAR RIGHT COMMON FEMORAL AUGMENT: NORMAL
BH CV LOWER VASCULAR RIGHT COMMON FEMORAL COMPETENT: NORMAL
BH CV LOWER VASCULAR RIGHT COMMON FEMORAL COMPRESS: NORMAL
BH CV LOWER VASCULAR RIGHT COMMON FEMORAL PHASIC: NORMAL
BH CV LOWER VASCULAR RIGHT COMMON FEMORAL SPONT: NORMAL
BH CV LOWER VASCULAR RIGHT DISTAL FEMORAL COMPRESS: NORMAL
BH CV LOWER VASCULAR RIGHT GASTRONEMIUS COMPRESS: NORMAL
BH CV LOWER VASCULAR RIGHT GREATER SAPH AK COMPRESS: NORMAL
BH CV LOWER VASCULAR RIGHT GREATER SAPH BK COMPRESS: NORMAL
BH CV LOWER VASCULAR RIGHT LESSER SAPH COMPRESS: NORMAL
BH CV LOWER VASCULAR RIGHT MID FEMORAL AUGMENT: NORMAL
BH CV LOWER VASCULAR RIGHT MID FEMORAL COMPETENT: NORMAL
BH CV LOWER VASCULAR RIGHT MID FEMORAL COMPRESS: NORMAL
BH CV LOWER VASCULAR RIGHT MID FEMORAL PHASIC: NORMAL
BH CV LOWER VASCULAR RIGHT MID FEMORAL SPONT: NORMAL
BH CV LOWER VASCULAR RIGHT PERONEAL COMPRESS: NORMAL
BH CV LOWER VASCULAR RIGHT POPLITEAL AUGMENT: NORMAL
BH CV LOWER VASCULAR RIGHT POPLITEAL COMPETENT: NORMAL
BH CV LOWER VASCULAR RIGHT POPLITEAL COMPRESS: NORMAL
BH CV LOWER VASCULAR RIGHT POPLITEAL PHASIC: NORMAL
BH CV LOWER VASCULAR RIGHT POPLITEAL SPONT: NORMAL
BH CV LOWER VASCULAR RIGHT POSTERIOR TIBIAL COMPRESS: NORMAL
BH CV LOWER VASCULAR RIGHT PROXIMAL FEMORAL COMPRESS: NORMAL
BH CV LOWER VASCULAR RIGHT SAPHENOFEMORAL JUNCTION COMPRESS: NORMAL
MAXIMAL PREDICTED HEART RATE: 147 BPM
STRESS TARGET HR: 125 BPM

## 2022-12-02 PROCEDURE — 93971 EXTREMITY STUDY: CPT

## 2022-12-02 PROCEDURE — 93971 EXTREMITY STUDY: CPT | Performed by: SURGERY

## 2022-12-02 RX ORDER — TRAMADOL HYDROCHLORIDE 50 MG/1
50 TABLET ORAL EVERY 6 HOURS PRN
COMMUNITY
End: 2022-12-02 | Stop reason: SDUPTHER

## 2022-12-02 RX ORDER — TRAMADOL HYDROCHLORIDE 50 MG/1
50 TABLET ORAL EVERY 8 HOURS PRN
Qty: 90 TABLET | Refills: 2 | Status: SHIPPED | OUTPATIENT
Start: 2022-12-02 | End: 2023-01-09 | Stop reason: SDUPTHER

## 2022-12-02 NOTE — PROGRESS NOTES
Please call and inform patient that her scan of her leg was negative for blood clot.  I feels she probably has the Baker's cyst which we can send her to Ortho for treatment if she would like.

## 2022-12-05 ENCOUNTER — PATIENT MESSAGE (OUTPATIENT)
Dept: FAMILY MEDICINE CLINIC | Facility: CLINIC | Age: 72
End: 2022-12-05

## 2022-12-08 ENCOUNTER — OFFICE VISIT (OUTPATIENT)
Dept: ORTHOPEDIC SURGERY | Facility: CLINIC | Age: 72
End: 2022-12-08

## 2022-12-08 VITALS — BODY MASS INDEX: 30.12 KG/M2 | HEIGHT: 63 IN | WEIGHT: 170 LBS

## 2022-12-08 DIAGNOSIS — M22.2X1 PATELLOFEMORAL PAIN SYNDROME OF RIGHT KNEE: ICD-10-CM

## 2022-12-08 DIAGNOSIS — M25.561 RIGHT KNEE PAIN, UNSPECIFIED CHRONICITY: Primary | ICD-10-CM

## 2022-12-08 PROCEDURE — 99203 OFFICE O/P NEW LOW 30 MIN: CPT | Performed by: ORTHOPAEDIC SURGERY

## 2022-12-08 NOTE — PROGRESS NOTES
"Chief Complaint  Initial Evaluation of the Right Knee     Elizabeth Wilson presents to [facility] for initial evaluation of the right knee. She notes the last three months having increase soreness with ambulation.  She has had no injury or falls. She has had no recent treatment to her knees.  Her knees bother her the most with stairs.     No Known Allergies     Social History     Socioeconomic History   • Marital status:    Tobacco Use   • Smoking status: Never     Passive exposure: Never   • Smokeless tobacco: Never   Vaping Use   • Vaping Use: Never used   Substance and Sexual Activity   • Alcohol use: Yes   • Drug use: Never   • Sexual activity: Defer        Review of Systems     Objective   Vital Signs:   Ht 160 cm (63\")   Wt 77.1 kg (170 lb)   BMI 30.11 kg/m²       Physical Exam  Constitutional:       Appearance: Normal appearance. Patient is well-developed and normal weight.   HENT:      Head: Normocephalic.      Right Ear: Hearing and external ear normal.      Left Ear: Hearing and external ear normal.      Nose: Nose normal.   Eyes:      Conjunctiva/sclera: Conjunctivae normal.   Cardiovascular:      Rate and Rhythm: Normal rate.   Pulmonary:      Effort: Pulmonary effort is normal.      Breath sounds: No wheezing or rales.   Abdominal:      Palpations: Abdomen is soft.      Tenderness: There is no abdominal tenderness.   Musculoskeletal:      Cervical back: Normal range of motion.   Skin:     Findings: No rash.   Neurological:      Mental Status: Patient is alert and oriented to person, place, and time.   Psychiatric:         Mood and Affect: Mood and affect normal.         Judgment: Judgment normal.       Ortho Exam      RIGHT KNEE Dorsal pedal pulse 2+. Posterior tibialis pulse is 2+. Good tone of hip flexors, hip extensors, and hip abductors. Crepitus.  Calf supple. Sensation intact. Neurovascular Intact. No swelling. No skin discoloration or muscle " atrophy.. ROM full. No pain with palpation.  More sore on the back of the knee.  She has a bakers cyst.     Procedures      Imaging Results (Most Recent)     Procedure Component Value Units Date/Time    XR Knee 3 View Right [[id number]] Resulted: 12/08/22 0843     Updated: 12/08/22 0844           Result Review :     X-Ray Report:  Right knee X-Ray  Indication: Evaluation of the right knee.   AP/Lateral and [place] view(s)  Findings: No acute osseous abnormality, no dislocation or fracture.   Prior studies available for comparison: No        Duplex Venous Lower Extremity - Right CAR    Result Date: 12/2/2022  Narrative: •  Normal right lower extremity venous duplex scan.              Assessment and Plan     Diagnoses and all orders for this visit:    1. Right knee pain, unspecified chronicity (Primary)  -     XR Knee 3 View Right    2. Patellofemoral pain syndrome of right knee        Discussed the treatment plan with the patient. Discussed conservative measures as exercises, anti-inflammatory and injection. She has medium arthritis and some spurs. Discussed inflammation and the reason of the bakers cyst.  She was placed on anti-inflammatory medication and take consistently for a couple weeks. She was given some HEP to perform at home.     Call or return if worsening symptoms.    Follow Up     PRN      Patient was given instructions and counseling regarding her condition or for health maintenance advice. Please see specific information pulled into the AVS if appropriate.     Scribed for Florentin Butterfield MD by Jennifer Salgado MA.  12/08/22   09:15 EST    I have personally performed the services described in this document as scribed by the above individual and it is both accurate and complete. Florentin Butterfield MD 12/08/22

## 2023-01-06 ENCOUNTER — OFFICE VISIT (OUTPATIENT)
Dept: FAMILY MEDICINE CLINIC | Facility: CLINIC | Age: 73
End: 2023-01-06
Payer: MEDICARE

## 2023-01-06 VITALS
SYSTOLIC BLOOD PRESSURE: 128 MMHG | RESPIRATION RATE: 14 BRPM | HEART RATE: 58 BPM | WEIGHT: 173 LBS | HEIGHT: 63 IN | BODY MASS INDEX: 30.65 KG/M2 | DIASTOLIC BLOOD PRESSURE: 74 MMHG | OXYGEN SATURATION: 99 % | TEMPERATURE: 97.8 F

## 2023-01-06 DIAGNOSIS — Z13.820 OSTEOPOROSIS SCREENING: Primary | ICD-10-CM

## 2023-01-06 DIAGNOSIS — K21.00 GASTROESOPHAGEAL REFLUX DISEASE WITH ESOPHAGITIS WITHOUT HEMORRHAGE: ICD-10-CM

## 2023-01-06 DIAGNOSIS — I10 PRIMARY HYPERTENSION: ICD-10-CM

## 2023-01-06 DIAGNOSIS — M85.89 OTHER SPECIFIED DISORDERS OF BONE DENSITY AND STRUCTURE, MULTIPLE SITES: ICD-10-CM

## 2023-01-06 PROCEDURE — 99214 OFFICE O/P EST MOD 30 MIN: CPT | Performed by: FAMILY MEDICINE

## 2023-01-06 RX ORDER — RABEPRAZOLE SODIUM 20 MG/1
20 TABLET, DELAYED RELEASE ORAL DAILY
Qty: 90 TABLET | Refills: 1 | Status: SHIPPED | OUTPATIENT
Start: 2023-01-06 | End: 2023-02-22 | Stop reason: SDUPTHER

## 2023-01-06 NOTE — PROGRESS NOTES
Answers for HPI/ROS submitted by the patient on 12/30/2022  Please describe your symptoms.: follow-up  Have you had these symptoms before?: Yes  How long have you been having these symptoms?: Greater than 2 weeks  Please list any medications you are currently taking for this condition.: routine follow-up  Please describe any probable cause for these symptoms. : follow-up  What is the primary reason for your visit?: Other    Chief Complaint  Chief Complaint   Patient presents with   • mood swings     1 month f/u , pt states she is better with meds       HPI:  Suzette Wilson presents to [facility]     Mood Swings- pt reports she is definitely feeling much better since starting the Prempro.  Pt reports her family has noticed a difference and want her to stay on the medication.  Pt is very cheerful and happy in the office today.  We will be continuing her medication.     HTN- Pt BP today is 128/74 which is well controlled.  Pt is compliant with their medication and will continue their current medication regimen. No side effects to the medication.    GERD- pt reports she has a lot of heartburn and she needs a refill on heartburn medication and Aciphex is the one that her insurance will cover.     Procedures     Past History:  Medical History: has a past medical history of Acid reflux, Hemorrhoids, Hypertension, Medial meniscus tear (08/11/2016), and Osteoarthritis of left knee (08/24/2016).   Surgical History: has a past surgical history that includes Colonoscopy; Cystocele repair; Facelift; Esophagogastroduodenoscopy (2017); Rectal surgery; and Tonsillectomy (1968).   Family History: family history includes Alzheimer's disease in her brother and sister; Stroke in her father and mother.   Social History: reports that she has never smoked. She has never been exposed to tobacco smoke. She has never used smokeless tobacco. She reports current alcohol use. She reports that she does not use  "drugs.    There is no immunization history on file for this patient.      Allergies: Patient has no known allergies.     Medications:  Current Outpatient Medications on File Prior to Visit   Medication Sig Dispense Refill   • buPROPion XL (Wellbutrin XL) 150 MG 24 hr tablet Take 1 tablet by mouth Daily for 90 days. 90 tablet 1   • estrogen, conjugated,-medroxyprogesterone (Prempro) 0.3-1.5 MG per tablet Take 1 tablet by mouth Daily for 90 days. 90 tablet 1   • lisinopril (PRINIVIL,ZESTRIL) 20 MG tablet Take 1 tablet by mouth Daily for 90 days. 90 tablet 3   • Magnesium 250 MG tablet magnesium 250 mg oral tablet take 1 tablet by oral route once a day (at bedtime)   Active     • meloxicam (MOBIC) 15 MG tablet Take 1 tablet by mouth Daily. 90 tablet 1   • metoprolol succinate XL (TOPROL-XL) 50 MG 24 hr tablet Take 1 tablet by mouth Daily for 90 days. 90 tablet 3     No current facility-administered medications on file prior to visit.        Health Maintenance Due   Topic Date Due   • DXA SCAN  Never done   • ZOSTER VACCINE (1 of 2) Never done   • HEPATITIS C SCREENING  Never done       Vital Signs:   Vitals:    01/06/23 1327   BP: 128/74   BP Location: Left arm   Patient Position: Sitting   Pulse: 58   Resp: 14   Temp: 97.8 °F (36.6 °C)   SpO2: 99%   Weight: 78.5 kg (173 lb)   Height: 160 cm (63\")      Body mass index is 30.65 kg/m².     ROS:  Review of Systems   Constitutional: Negative for fatigue and fever.   HENT: Negative for congestion, ear pain and sinus pressure.    Respiratory: Negative for cough, chest tightness and shortness of breath.    Cardiovascular: Negative for chest pain, palpitations and leg swelling.   Gastrointestinal: Negative for abdominal pain and diarrhea.   Genitourinary: Negative for dysuria and frequency.   Neurological: Negative for speech difficulty, headache and confusion.   Psychiatric/Behavioral: Negative for agitation and behavioral problems.          Physical Exam  Vitals reviewed. "   Constitutional:       Appearance: Normal appearance.   HENT:      Right Ear: Tympanic membrane normal.      Left Ear: Tympanic membrane normal.      Nose: Nose normal.   Eyes:      Extraocular Movements: Extraocular movements intact.      Conjunctiva/sclera: Conjunctivae normal.      Pupils: Pupils are equal, round, and reactive to light.   Cardiovascular:      Rate and Rhythm: Normal rate and regular rhythm.   Pulmonary:      Effort: Pulmonary effort is normal.      Breath sounds: Normal breath sounds.   Abdominal:      General: Bowel sounds are normal.   Musculoskeletal:         General: Normal range of motion.      Cervical back: Normal range of motion.   Skin:     General: Skin is warm and dry.   Neurological:      General: No focal deficit present.      Mental Status: She is alert and oriented to person, place, and time.   Psychiatric:         Mood and Affect: Mood normal.         Behavior: Behavior normal.          Result Review        Diagnoses and all orders for this visit:    1. Osteoporosis screening (Primary)  -     Dexa Bone Density, Axial (Every 2 Years); Future    2. Other specified disorders of bone density and structure, multiple sites    3. Primary hypertension    4. Gastroesophageal reflux disease with esophagitis without hemorrhage  -     RABEprazole (ACIPHEX) 20 MG EC tablet; Take 1 tablet by mouth Daily.  Dispense: 90 tablet; Refill: 1          Smoking Cessation:    Suzette Wilson  reports that she has never smoked. She has never been exposed to tobacco smoke. She has never used smokeless tobacco.          Follow Up   Return in about 3 months (around 4/6/2023).  Patient was given instructions and counseling regarding her condition or for health maintenance advice. Please see specific information pulled into the AVS if appropriate.       Isis Ordonez MD

## 2023-01-09 DIAGNOSIS — M16.11 OSTEOARTHRITIS OF RIGHT HIP, UNSPECIFIED OSTEOARTHRITIS TYPE: ICD-10-CM

## 2023-01-12 RX ORDER — TRAMADOL HYDROCHLORIDE 50 MG/1
50 TABLET ORAL EVERY 8 HOURS PRN
Qty: 90 TABLET | Refills: 2 | Status: SHIPPED | OUTPATIENT
Start: 2023-01-12 | End: 2023-02-11

## 2023-01-16 ENCOUNTER — TELEPHONE (OUTPATIENT)
Dept: FAMILY MEDICINE CLINIC | Facility: CLINIC | Age: 73
End: 2023-01-16
Payer: MEDICARE

## 2023-01-16 NOTE — TELEPHONE ENCOUNTER
Left a detailed message advising patient that her tramadol prescription is ready to  in the office at her convenience.

## 2023-02-03 ENCOUNTER — HOSPITAL ENCOUNTER (OUTPATIENT)
Dept: BONE DENSITY | Facility: HOSPITAL | Age: 73
Discharge: HOME | End: 2023-02-03
Admitting: FAMILY MEDICINE
Payer: MEDICARE

## 2023-02-03 DIAGNOSIS — Z13.820 OSTEOPOROSIS SCREENING: ICD-10-CM

## 2023-02-03 PROCEDURE — 77080 DXA BONE DENSITY AXIAL: CPT

## 2023-02-22 DIAGNOSIS — K21.00 GASTROESOPHAGEAL REFLUX DISEASE WITH ESOPHAGITIS WITHOUT HEMORRHAGE: ICD-10-CM

## 2023-02-22 NOTE — TELEPHONE ENCOUNTER
Caller: Suzette Wilson    Relationship: Self    Best call back number:    [phone]      Requested Prescriptions:   Requested Prescriptions     Pending Prescriptions Disp Refills   • RABEprazole (ACIPHEX) 20 MG EC tablet 90 tablet 1     Sig: Take 1 tablet by mouth Daily.        Pharmacy where request should be sent: [facility], [address] [phone] [facility] [phone] FX     Additional details provided by patient:     Does the patient have less than a 3 day supply:  [] Yes  [x] No    Would you like a call back once the refill request has been completed: [x] Yes [] No    If the office needs to give you a call back, can they leave a voicemail: [x] Yes [] No    Khanh Giordano Rep   02/22/23 15:45 EST

## 2023-02-23 RX ORDER — RABEPRAZOLE SODIUM 20 MG/1
20 TABLET, DELAYED RELEASE ORAL DAILY
Qty: 90 TABLET | Refills: 1 | Status: SHIPPED | OUTPATIENT
Start: 2023-02-23

## 2023-04-06 ENCOUNTER — OFFICE VISIT (OUTPATIENT)
Dept: FAMILY MEDICINE CLINIC | Facility: CLINIC | Age: 73
End: 2023-04-06
Payer: MEDICARE

## 2023-04-06 VITALS
HEART RATE: 62 BPM | HEIGHT: 63 IN | BODY MASS INDEX: 30.12 KG/M2 | DIASTOLIC BLOOD PRESSURE: 70 MMHG | SYSTOLIC BLOOD PRESSURE: 122 MMHG | TEMPERATURE: 97.9 F | WEIGHT: 170 LBS | OXYGEN SATURATION: 98 %

## 2023-04-06 DIAGNOSIS — L98.9 SKIN LESION: ICD-10-CM

## 2023-04-06 DIAGNOSIS — L98.9 SKIN LESION OF HAND: Primary | ICD-10-CM

## 2023-04-06 RX ORDER — TRAMADOL HYDROCHLORIDE 50 MG/1
1 TABLET ORAL DAILY
COMMUNITY
Start: 2023-02-24

## 2023-04-06 RX ORDER — SULFAMETHOXAZOLE AND TRIMETHOPRIM 800; 160 MG/1; MG/1
1 TABLET ORAL 2 TIMES DAILY
Qty: 20 TABLET | Refills: 0 | Status: SHIPPED | OUTPATIENT
Start: 2023-04-06 | End: 2023-04-16

## 2023-04-06 NOTE — PROGRESS NOTES
"Chief Complaint  Chief Complaint   Patient presents with   • Bump\" on left hand     Had for a few weeks, getting bigger, tender to touch       HPI:  Suzette Wilson presents to [facility]     The patient has a bump on her left hand that is tender. She cannot remember how long it has been there. It was only one at first, but now there are two. She denies any allergies to antibiotics.    We discussed the possibility of it being Basal cell carcinoma due to the fact that it is nodular and does not look like just an infection which we will check first.  Dermatologist may want to remove and send for pathology.    Procedures     Past History:  Medical History: has a past medical history of Acid reflux, Hemorrhoids, Hypertension, Medial meniscus tear (08/11/2016), and Osteoarthritis of left knee (08/24/2016).   Surgical History: has a past surgical history that includes Colonoscopy; Cystocele repair; Facelift; Esophagogastroduodenoscopy (2017); Rectal surgery; and Tonsillectomy (1968).   Family History: family history includes Alzheimer's disease in her brother and sister; Stroke in her father and mother.   Social History: reports that she has never smoked. She has never been exposed to tobacco smoke. She has never used smokeless tobacco. She reports current alcohol use. She reports that she does not use drugs.    There is no immunization history on file for this patient.      Allergies: Patient has no known allergies.     Medications:  Current Outpatient Medications on File Prior to Visit   Medication Sig Dispense Refill   • Magnesium 250 MG tablet magnesium 250 mg oral tablet take 1 tablet by oral route once a day (at bedtime)   Active     • meloxicam (MOBIC) 15 MG tablet Take 1 tablet by mouth Daily. 90 tablet 1   • RABEprazole (ACIPHEX) 20 MG EC tablet Take 1 tablet by mouth Daily. 90 tablet 1   • traMADol (ULTRAM) 50 MG tablet Take 1 tablet by mouth Daily.     • buPROPion XL " "(Wellbutrin XL) 150 MG 24 hr tablet Take 1 tablet by mouth Daily for 90 days. 90 tablet 1   • estrogen, conjugated,-medroxyprogesterone (Prempro) 0.3-1.5 MG per tablet Take 1 tablet by mouth Daily for 90 days. 90 tablet 1   • lisinopril (PRINIVIL,ZESTRIL) 20 MG tablet Take 1 tablet by mouth Daily for 90 days. 90 tablet 3   • metoprolol succinate XL (TOPROL-XL) 50 MG 24 hr tablet Take 1 tablet by mouth Daily for 90 days. 90 tablet 3     No current facility-administered medications on file prior to visit.        Health Maintenance Due   Topic Date Due   • ZOSTER VACCINE (1 of 2) Never done   • HEPATITIS C SCREENING  Never done       Vital Signs:   Vitals:    04/06/23 0824   BP: 122/70   Pulse: 62   Temp: 97.9 °F (36.6 °C)   SpO2: 98%   Weight: 77.1 kg (170 lb)   Height: 160 cm (63\")      Body mass index is 30.11 kg/m².     ROS:  Review of Systems   Constitutional: Negative for fatigue and fever.   HENT: Negative for congestion, ear pain and sinus pressure.    Respiratory: Negative for cough, chest tightness and shortness of breath.    Cardiovascular: Negative for chest pain, palpitations and leg swelling.   Gastrointestinal: Negative for abdominal pain and diarrhea.   Genitourinary: Negative for dysuria and frequency.   Neurological: Negative for speech difficulty, headache and confusion.   Psychiatric/Behavioral: Negative for agitation and behavioral problems.          Physical Exam  Vitals reviewed.   Constitutional:       Appearance: Normal appearance.   HENT:      Right Ear: Tympanic membrane normal.      Left Ear: Tympanic membrane normal.      Nose: Nose normal.   Eyes:      Extraocular Movements: Extraocular movements intact.      Conjunctiva/sclera: Conjunctivae normal.      Pupils: Pupils are equal, round, and reactive to light.   Cardiovascular:      Rate and Rhythm: Normal rate and regular rhythm.   Pulmonary:      Effort: Pulmonary effort is normal.      Breath sounds: Normal breath sounds.   Abdominal:    "   General: Bowel sounds are normal.   Musculoskeletal:         General: Normal range of motion.      Cervical back: Normal range of motion.   Skin:     General: Skin is warm and dry.   Neurological:      General: No focal deficit present.      Mental Status: She is alert and oriented to person, place, and time.   Psychiatric:         Mood and Affect: Mood normal.         Behavior: Behavior normal.          Result Review        Diagnoses and all orders for this visit:    1. Skin lesion of hand (Primary)  -     sulfamethoxazole-trimethoprim (Bactrim DS) 800-160 MG per tablet; Take 1 tablet by mouth 2 (Two) Times a Day for 10 days.  Dispense: 20 tablet; Refill: 0  -     Ambulatory Referral to Dermatology    2. Skin lesion  -     Ambulatory Referral to Dermatology      1. Hand lesion  - The patient will be prescribed antibiotics.  - She will be referred to dermatology.      Smoking Cessation:    Suzette Wilson  reports that she has never smoked. She has never been exposed to tobacco smoke. She has never used smokeless tobacco.          Follow Up   Return if symptoms worsen or fail to improve.  Patient was given instructions and counseling regarding her condition or for health maintenance advice. Please see specific information pulled into the AVS if appropriate.       Isis Ordonez MD     Transcribed from ambient dictation for Isis Ordonez MD by Meera Douglas.  04/06/23   10:37 EDT    Patient or patient representative verbalized consent to the visit recording.  I have personally performed the services described in this document as transcribed by the above individual, and it is both accurate and complete.

## 2023-04-11 ENCOUNTER — PATIENT MESSAGE (OUTPATIENT)
Dept: FAMILY MEDICINE CLINIC | Facility: CLINIC | Age: 73
End: 2023-04-11
Payer: MEDICARE

## 2023-05-02 RX ORDER — MELOXICAM 15 MG/1
15 TABLET ORAL DAILY
Qty: 90 TABLET | Refills: 1 | Status: SHIPPED | OUTPATIENT
Start: 2023-05-02

## 2023-05-05 NOTE — ASSESSMENT & PLAN NOTE
Not changed.  Recommend to keep doing brain games to keep memory sharp.   Quinolones Counseling:  I discussed with the patient the risks of fluoroquinolones including but not limited to GI upset, allergic reaction, drug rash, diarrhea, dizziness, photosensitivity, yeast infections, liver function test abnormalities, tendonitis/tendon rupture.

## 2023-07-24 DIAGNOSIS — I10 ESSENTIAL HYPERTENSION: ICD-10-CM

## 2023-07-24 DIAGNOSIS — G57.01 SCIATIC NERVE DISEASE, RIGHT: Primary | ICD-10-CM

## 2023-07-24 RX ORDER — LISINOPRIL 20 MG/1
20 TABLET ORAL DAILY
Qty: 90 TABLET | Refills: 3 | Status: SHIPPED | OUTPATIENT
Start: 2023-07-24 | End: 2023-10-22

## 2023-07-24 RX ORDER — TRAMADOL HYDROCHLORIDE 50 MG/1
50 TABLET ORAL DAILY
Qty: 90 TABLET | Refills: 2 | Status: SHIPPED | OUTPATIENT
Start: 2023-07-24 | End: 2023-08-23

## 2023-07-24 RX ORDER — METOPROLOL SUCCINATE 50 MG/1
50 TABLET, EXTENDED RELEASE ORAL DAILY
Qty: 90 TABLET | Refills: 3 | Status: SHIPPED | OUTPATIENT
Start: 2023-07-24 | End: 2023-10-22

## 2023-07-24 NOTE — TELEPHONE ENCOUNTER
Caller: Katie, Suzette DE JESUS    Relationship: Self    Best call back number: [phone]    Requested Prescriptions:   Requested Prescriptions     Pending Prescriptions Disp Refills    lisinopril (PRINIVIL,ZESTRIL) 20 MG tablet 90 tablet 3     Sig: Take 1 tablet by mouth Daily for 90 days.    metoprolol succinate XL (TOPROL-XL) 50 MG 24 hr tablet 90 tablet 3     Sig: Take 1 tablet by mouth Daily for 90 days.    traMADol (ULTRAM) 50 MG tablet       Sig: Take 1 tablet by mouth Daily.        Pharmacy where request should be sent: [place] [phone] [place] [phone]      Last office visit with prescribing clinician: 7/13/2023   Last telemedicine visit with prescribing clinician: Visit date not found   Next office visit with prescribing clinician: 10/9/2023     Additional details provided by patient:      Does the patient have less than a 3 day supply:  [x] Yes  [] No    Would you like a call back once the refill request has been completed: [] Yes [x] No    If the office needs to give you a call back, can they leave a voicemail: [] Yes [x] No    Khanh Ortiz   07/24/23 09:41 EDT

## 2023-07-25 DIAGNOSIS — F33.8 SEASONAL AFFECTIVE DISORDER: ICD-10-CM

## 2023-07-25 DIAGNOSIS — R45.86 MOOD SWINGS: ICD-10-CM

## 2023-07-25 RX ORDER — BUPROPION HYDROCHLORIDE 150 MG/1
150 TABLET ORAL DAILY
Qty: 90 TABLET | Refills: 1 | Status: SHIPPED | OUTPATIENT
Start: 2023-07-25 | End: 2024-01-21

## 2023-08-31 DIAGNOSIS — K21.00 GASTROESOPHAGEAL REFLUX DISEASE WITH ESOPHAGITIS WITHOUT HEMORRHAGE: ICD-10-CM

## 2023-08-31 RX ORDER — RABEPRAZOLE SODIUM 20 MG/1
20 TABLET, DELAYED RELEASE ORAL DAILY
Qty: 90 TABLET | Refills: 1 | Status: SHIPPED | OUTPATIENT
Start: 2023-08-31

## 2023-09-14 ENCOUNTER — TELEPHONE (OUTPATIENT)
Dept: FAMILY MEDICINE CLINIC | Facility: CLINIC | Age: 73
End: 2023-09-14
Payer: MEDICARE

## 2023-09-14 DIAGNOSIS — K21.00 GASTROESOPHAGEAL REFLUX DISEASE WITH ESOPHAGITIS WITHOUT HEMORRHAGE: ICD-10-CM

## 2023-09-14 RX ORDER — RABEPRAZOLE SODIUM 20 MG/1
20 TABLET, DELAYED RELEASE ORAL DAILY
Qty: 90 TABLET | Refills: 1 | Status: SHIPPED | OUTPATIENT
Start: 2023-09-14

## 2023-09-14 NOTE — TELEPHONE ENCOUNTER
HUB TO RELAY    Medication was sent and received at [facility] pharmacy on 8/31/23.  Medication was resent to [facility] pharmacy per patient request.

## 2023-09-14 NOTE — TELEPHONE ENCOUNTER
Caller:      Relationship: SELF     Best call back number: [phone]     Who are you requesting to speak with (clinical staff, provider,  specific staff member):  CLINICAL     What was the call regarding: THE REFILL THAT WAS DONE ON 8/31/23 WAS NOT AT [facility] WHEN HER  WENT TO PICK IT UP. SHE WAS TOLD THERE WAS A NEW PROCESS AND WANTED TO MAKE SURE WE WERE AWARE AND THAT HER MEDICATION WAS RESENT    PLEASE ADVISE

## 2023-10-06 RX ORDER — TRAMADOL HYDROCHLORIDE 50 MG/1
50 TABLET ORAL
COMMUNITY
Start: 2023-08-30 | End: 2023-10-09 | Stop reason: SDUPTHER

## 2023-10-09 ENCOUNTER — OFFICE VISIT (OUTPATIENT)
Dept: FAMILY MEDICINE CLINIC | Facility: CLINIC | Age: 73
End: 2023-10-09
Payer: MEDICARE

## 2023-10-09 VITALS
WEIGHT: 179.3 LBS | HEIGHT: 63 IN | DIASTOLIC BLOOD PRESSURE: 78 MMHG | OXYGEN SATURATION: 100 % | SYSTOLIC BLOOD PRESSURE: 118 MMHG | HEART RATE: 56 BPM | TEMPERATURE: 97.9 F | BODY MASS INDEX: 31.77 KG/M2

## 2023-10-09 DIAGNOSIS — K21.00 GASTROESOPHAGEAL REFLUX DISEASE WITH ESOPHAGITIS WITHOUT HEMORRHAGE: ICD-10-CM

## 2023-10-09 DIAGNOSIS — F33.8 SEASONAL AFFECTIVE DISORDER: ICD-10-CM

## 2023-10-09 DIAGNOSIS — G57.01 SCIATIC NERVE DISEASE, RIGHT: ICD-10-CM

## 2023-10-09 DIAGNOSIS — I10 ESSENTIAL HYPERTENSION: ICD-10-CM

## 2023-10-09 DIAGNOSIS — R45.86 MOOD SWINGS: ICD-10-CM

## 2023-10-09 DIAGNOSIS — N95.1 VAGINAL DRYNESS, MENOPAUSAL: ICD-10-CM

## 2023-10-09 DIAGNOSIS — Z00.00 ENCOUNTER FOR SUBSEQUENT ANNUAL WELLNESS VISIT (AWV) IN MEDICARE PATIENT: Primary | ICD-10-CM

## 2023-10-09 DIAGNOSIS — M85.80 OSTEOPENIA AFTER MENOPAUSE: ICD-10-CM

## 2023-10-09 DIAGNOSIS — Z78.0 OSTEOPENIA AFTER MENOPAUSE: ICD-10-CM

## 2023-10-09 RX ORDER — ALENDRONATE SODIUM 35 MG/1
35 TABLET ORAL
Qty: 12 TABLET | Refills: 3 | Status: SHIPPED | OUTPATIENT
Start: 2023-10-09 | End: 2024-09-09

## 2023-10-09 RX ORDER — METOPROLOL SUCCINATE 50 MG/1
50 TABLET, EXTENDED RELEASE ORAL DAILY
Qty: 90 TABLET | Refills: 3 | Status: SHIPPED | OUTPATIENT
Start: 2023-10-09 | End: 2024-10-03

## 2023-10-09 RX ORDER — LISINOPRIL 20 MG/1
20 TABLET ORAL DAILY
Qty: 90 TABLET | Refills: 3 | Status: SHIPPED | OUTPATIENT
Start: 2023-10-09 | End: 2024-10-03

## 2023-10-09 RX ORDER — ESTROGEN,CON/M-PROGEST ACET 0.3-1.5MG
1 TABLET ORAL DAILY
Qty: 90 TABLET | Refills: 1 | Status: SHIPPED | OUTPATIENT
Start: 2023-10-09 | End: 2024-04-06

## 2023-10-09 RX ORDER — TRAMADOL HYDROCHLORIDE 50 MG/1
50 TABLET ORAL EVERY 12 HOURS PRN
Qty: 180 TABLET | Refills: 1 | Status: SHIPPED | OUTPATIENT
Start: 2023-10-09 | End: 2024-04-06

## 2023-10-09 RX ORDER — BUPROPION HYDROCHLORIDE 150 MG/1
150 TABLET ORAL DAILY
Qty: 90 TABLET | Refills: 1 | Status: SHIPPED | OUTPATIENT
Start: 2023-10-09 | End: 2024-04-06

## 2023-10-09 RX ORDER — RABEPRAZOLE SODIUM 20 MG/1
20 TABLET, DELAYED RELEASE ORAL DAILY
Qty: 90 TABLET | Refills: 1 | Status: SHIPPED | OUTPATIENT
Start: 2023-10-09

## 2023-10-09 NOTE — PROGRESS NOTES
The ABCs of the Annual Wellness Visit  Subsequent Medicare Wellness Visit    Subjective    Suzette Wilson is a 73 y.o. female who presents for a Subsequent Medicare Wellness Visit.    The following portions of the patient's history were reviewed and   updated as appropriate: allergies, current medications, past family history, past medical history, past social history, past surgical history, and problem list.    Compared to one year ago, the patient feels her physical   health is the same.    Compared to one year ago, the patient feels her mental   health is better.    Recent Hospitalizations:  She was not admitted to the hospital during the last year.       Current Medical Providers:  Patient Care Team:  Isis Ordonez MD as PCP - General (Family Medicine)    Outpatient Medications Prior to Visit   Medication Sig Dispense Refill    Magnesium 250 MG tablet magnesium 250 mg oral tablet take 1 tablet by oral route once a day (at bedtime)   Active      meloxicam (MOBIC) 15 MG tablet Take 1 tablet by mouth Daily. 90 tablet 1    buPROPion XL (Wellbutrin XL) 150 MG 24 hr tablet Take 1 tablet by mouth Daily for 180 days. 90 tablet 1    lisinopril (PRINIVIL,ZESTRIL) 20 MG tablet Take 1 tablet by mouth Daily for 90 days. 90 tablet 3    metoprolol succinate XL (TOPROL-XL) 50 MG 24 hr tablet Take 1 tablet by mouth Daily for 90 days. 90 tablet 3    RABEprazole (ACIPHEX) 20 MG EC tablet Take 1 tablet by mouth Daily. 90 tablet 1    traMADol (ULTRAM) 50 MG tablet Take 1 tablet by mouth.      loratadine (Claritin) 10 MG tablet Take 1 tablet by mouth Daily for 90 days. (Patient not taking: Reported on 10/9/2023) 90 tablet 0    estrogen, conjugated,-medroxyprogesterone (Prempro) 0.3-1.5 MG per tablet Take 1 tablet by mouth Daily for 90 days. 90 tablet 1     No facility-administered medications prior to visit.       Opioid medication/s are on active medication list.  and I have evaluated her active treatment plan and pain score  "trends (see table).  There were no vitals filed for this visit.  I have reviewed the chart for potential of high risk medication and harmful drug interactions in the elderly.          Aspirin is not on active medication list.  Aspirin use is contraindicated for this patient due to: current NSAID therapy.  .    Patient Active Problem List   Diagnosis    Esophageal reflux    Hypertension    Urge incontinence    Memory problem    Anemia    Sciatic nerve disease, right    Mood swings    Muscle spasms of both lower extremities     Advance Care Planning   Advance Care Planning     Advance Directive is not on file.  ACP discussion was held with the patient during this visit. Patient has an advance directive (not in EMR), copy requested.     Objective    Vitals:    10/09/23 0931   BP: 118/78   Pulse: 56   Temp: 97.9 øF (36.6 øC)   SpO2: 100%   Weight: 81.3 kg (179 lb 4.8 oz)   Height: 160 cm (63\")     Estimated body mass index is 31.76 kg/mý as calculated from the following:    Height as of this encounter: 160 cm (63\").    Weight as of this encounter: 81.3 kg (179 lb 4.8 oz).    BMI is >= 30 and <35. (Class 1 Obesity). The following options were offered after discussion;: exercise counseling/recommendations      Does the patient have evidence of cognitive impairment? No    Lab Results   Component Value Date    TRIG 119 07/13/2023    HDL 46 07/13/2023     (H) 07/13/2023    VLDL 21 07/13/2023        HEALTH RISK ASSESSMENT    Smoking Status:  Social History     Tobacco Use   Smoking Status Never    Passive exposure: Never   Smokeless Tobacco Never     Alcohol Consumption:  Social History     Substance and Sexual Activity   Alcohol Use Yes     Fall Risk Screen:    STEADI Fall Risk Assessment was completed, and patient is at LOW risk for falls.Assessment completed on:10/9/2023    Depression Screening:      10/9/2023     9:30 AM   PHQ-2/PHQ-9 Depression Screening   Little Interest or Pleasure in Doing Things 0-->not at all "   Feeling Down, Depressed or Hopeless 0-->not at all   PHQ-9: Brief Depression Severity Measure Score 0       Health Habits and Functional and Cognitive Screening:      10/9/2023     9:27 AM   Functional & Cognitive Status   Do you have difficulty preparing food and eating? No   Do you have difficulty bathing yourself, getting dressed or grooming yourself? No   Do you have difficulty using the toilet? No   Do you have difficulty moving around from place to place? No   Do you have trouble with steps or getting out of a bed or a chair? No   Current Diet Well Balanced Diet   Dental Exam Up to date   Eye Exam Up to date   Exercise (times per week) 7 times per week   Current Exercises Include Walking   Do you need help using the phone?  No   Are you deaf or do you have serious difficulty hearing?  No   Do you need help to go to places out of walking distance? No   Do you need help shopping? No   Do you need help preparing meals?  No   Do you need help with housework?  No   Do you need help with laundry? No   Do you need help taking your medications? No   Do you need help managing money? No   Do you ever drive or ride in a car without wearing a seat belt? No   Have you felt unusual stress, anger or loneliness in the last month? No   Who do you live with? Spouse   If you need help, do you have trouble finding someone available to you? No   Have you been bothered in the last four weeks by sexual problems? No   Do you have difficulty concentrating, remembering or making decisions? Yes       Age-appropriate Screening Schedule:  Refer to the list below for future screening recommendations based on patient's age, sex and/or medical conditions. Orders for these recommended tests are listed in the plan section. The patient has been provided with a written plan.    Health Maintenance   Topic Date Due    BMI FOLLOWUP  Never done    ZOSTER VACCINE (1 of 2) Never done    HEPATITIS C SCREENING  Never done    INFLUENZA VACCINE  Never  done    TDAP/TD VACCINES (1 - Tdap) 10/26/2023 (Originally 10/8/1968)    COVID-19 Vaccine (1) 12/29/2023 (Originally 4/8/1951)    Pneumococcal Vaccine 65+ (1 - PCV) 01/06/2024 (Originally 10/8/2014)    MAMMOGRAM  12/29/2023    ANNUAL WELLNESS VISIT  10/09/2024    DXA SCAN  02/03/2025    COLORECTAL CANCER SCREENING  04/24/2027                  CMS Preventative Services Quick Reference  Risk Factors Identified During Encounter  Inactivity/Sedentary: Patient was advised to exercise at least 150 minutes a week per CDC recommendations.  The above risks/problems have been discussed with the patient.  Pertinent information has been shared with the patient in the After Visit Summary.  An After Visit Summary and PPPS were made available to the patient.    Follow Up:   Next Medicare Wellness visit to be scheduled in 1 year.       Additional E&M Note during same encounter follows:  Patient has multiple medical problems which are significant and separately identifiable that require additional work above and beyond the Medicare Wellness Visit.      Chief Complaint  Medicare Wellness-subsequent    Subjective        HPI  Suzette Wilson is also being seen today for Arthritis which she feels more in her left shoulder and tops of her feet.     Patient reports that she needs a refill of her Prempro that has been working well for her hot flashes and her postmenopausal symptoms.    HTN- Pt BP today is 118/78 which is well controlled.  Pt is compliant with their medication and will continue their current medication regimen. No side effects to the medication.     Osteopenia-patient's last DEXA scan that was done on February 3, 2023 showed that she has osteopenia in her left and right hip but worse on the left.  Patient reports that her mother had a hip fracture and we will be starting her on Fosamax 35 mg once weekly to help build her bones.  We will repeat her DEXA scan in 2 years.         Objective   Vital Signs:  /78   Pulse 56  "  Temp 97.9 øF (36.6 øC)   Ht 160 cm (63\")   Wt 81.3 kg (179 lb 4.8 oz)   SpO2 100%   BMI 31.76 kg/mý     Physical Exam  Vitals reviewed.   Constitutional:       Appearance: Normal appearance.   HENT:      Right Ear: Tympanic membrane normal.      Left Ear: Tympanic membrane normal.      Nose: Nose normal.   Eyes:      Extraocular Movements: Extraocular movements intact.      Conjunctiva/sclera: Conjunctivae normal.      Pupils: Pupils are equal, round, and reactive to light.   Cardiovascular:      Rate and Rhythm: Normal rate and regular rhythm.   Pulmonary:      Effort: Pulmonary effort is normal.      Breath sounds: Normal breath sounds.   Abdominal:      General: Bowel sounds are normal.   Musculoskeletal:         General: Normal range of motion.      Cervical back: Normal range of motion.   Skin:     General: Skin is warm and dry.   Neurological:      General: No focal deficit present.      Mental Status: She is alert and oriented to person, place, and time.   Psychiatric:         Mood and Affect: Mood normal.         Behavior: Behavior normal.        DEXA Bone Density Axial (02/03/2023 08:24)              Assessment and Plan   Diagnoses and all orders for this visit:    1. Encounter for subsequent annual wellness visit (AWV) in Medicare patient (Primary)    2. Vaginal dryness, menopausal  -     estrogen, conjugated,-medroxyprogesterone (Prempro) 0.3-1.5 MG per tablet; Take 1 tablet by mouth Daily for 180 days.  Dispense: 90 tablet; Refill: 1    3. Gastroesophageal reflux disease with esophagitis without hemorrhage  -     RABEprazole (ACIPHEX) 20 MG EC tablet; Take 1 tablet by mouth Daily.  Dispense: 90 tablet; Refill: 1    4. Essential hypertension  -     metoprolol succinate XL (TOPROL-XL) 50 MG 24 hr tablet; Take 1 tablet by mouth Daily for 360 days.  Dispense: 90 tablet; Refill: 3  -     lisinopril (PRINIVIL,ZESTRIL) 20 MG tablet; Take 1 tablet by mouth Daily for 360 days.  Dispense: 90 tablet; Refill: " 3    5. Mood swings  -     buPROPion XL (Wellbutrin XL) 150 MG 24 hr tablet; Take 1 tablet by mouth Daily for 180 days.  Dispense: 90 tablet; Refill: 1    6. Seasonal affective disorder  -     buPROPion XL (Wellbutrin XL) 150 MG 24 hr tablet; Take 1 tablet by mouth Daily for 180 days.  Dispense: 90 tablet; Refill: 1    7. Sciatic nerve disease, right  -     traMADol (ULTRAM) 50 MG tablet; Take 1 tablet by mouth Every 12 (Twelve) Hours As Needed for Moderate Pain for up to 180 days.  Dispense: 180 tablet; Refill: 1    8. Osteopenia after menopause  -     alendronate (FOSAMAX) 35 MG tablet; Take 1 tablet by mouth Every 7 (Seven) Days for 336 days.  Dispense: 12 tablet; Refill: 3           I spent 35 minutes caring for Suzette on this date of service. This time includes time spent by me in the following activities:reviewing tests  Follow Up   Return in about 3 months (around 1/9/2024).  Patient was given instructions and counseling regarding her condition or for health maintenance advice. Please see specific information pulled into the AVS if appropriate.

## 2023-10-20 ENCOUNTER — PATIENT ROUNDING (BHMG ONLY) (OUTPATIENT)
Dept: FAMILY MEDICINE CLINIC | Facility: CLINIC | Age: 73
End: 2023-10-20
Payer: MEDICARE

## 2023-10-20 NOTE — PROGRESS NOTES
October 20, 2023    Hello, may I speak with Suzette Wilson?    My name is Leroy Stewart      I am  with [facility]  [address]  [address]  [place] [address]  [phone].    Before we get started may I verify your date of birth? 1950    I am calling to officially welcome you to our practice and ask about your recent visit. Is this a good time to talk? yes    Tell me about your visit with us. What things went well?  It always a great visit when I see Dr Ordonez.       We're always looking for ways to make our patients' experiences even better. Do you have recommendations on ways we may improve?  no    Overall were you satisfied with your first visit to our practice? yes       I appreciate you taking the time to speak with me today. Is there anything else I can do for you? no      Thank you, and have a great day.

## 2024-02-13 ENCOUNTER — OFFICE VISIT (OUTPATIENT)
Dept: FAMILY MEDICINE CLINIC | Facility: CLINIC | Age: 74
End: 2024-02-13
Payer: MEDICARE

## 2024-02-13 VITALS
HEIGHT: 63 IN | BODY MASS INDEX: 31.57 KG/M2 | WEIGHT: 178.2 LBS | DIASTOLIC BLOOD PRESSURE: 68 MMHG | TEMPERATURE: 98.6 F | SYSTOLIC BLOOD PRESSURE: 126 MMHG | OXYGEN SATURATION: 98 % | HEART RATE: 69 BPM

## 2024-02-13 DIAGNOSIS — R79.89 ABNORMAL CBC: ICD-10-CM

## 2024-02-13 DIAGNOSIS — I10 PRIMARY HYPERTENSION: Primary | ICD-10-CM

## 2024-02-13 DIAGNOSIS — H93.11 TINNITUS OF RIGHT EAR: ICD-10-CM

## 2024-02-13 DIAGNOSIS — L60.0 INGROWN RIGHT BIG TOENAIL: ICD-10-CM

## 2024-02-13 DIAGNOSIS — R41.3 MEMORY LOSS: ICD-10-CM

## 2024-02-13 LAB
ALBUMIN SERPL-MCNC: 4.6 G/DL (ref 3.5–5.2)
ALBUMIN/GLOB SERPL: 2.2 G/DL
ALP SERPL-CCNC: 57 U/L (ref 39–117)
ALT SERPL W P-5'-P-CCNC: 12 U/L (ref 1–33)
ANION GAP SERPL CALCULATED.3IONS-SCNC: 9 MMOL/L (ref 5–15)
AST SERPL-CCNC: 21 U/L (ref 1–32)
BASOPHILS # BLD AUTO: 0.03 10*3/MM3 (ref 0–0.2)
BASOPHILS NFR BLD AUTO: 0.5 % (ref 0–1.5)
BILIRUB BLD-MCNC: NEGATIVE MG/DL
BILIRUB SERPL-MCNC: 0.4 MG/DL (ref 0–1.2)
BUN SERPL-MCNC: 15 MG/DL (ref 8–23)
BUN/CREAT SERPL: 16.5 (ref 7–25)
CALCIUM SPEC-SCNC: 8.9 MG/DL (ref 8.6–10.5)
CHLORIDE SERPL-SCNC: 104 MMOL/L (ref 98–107)
CLARITY, POC: ABNORMAL
CO2 SERPL-SCNC: 25 MMOL/L (ref 22–29)
COLOR UR: ABNORMAL
CREAT SERPL-MCNC: 0.91 MG/DL (ref 0.57–1)
DEPRECATED RDW RBC AUTO: 42.3 FL (ref 37–54)
EGFRCR SERPLBLD CKD-EPI 2021: 66.3 ML/MIN/1.73
EOSINOPHIL # BLD AUTO: 0.05 10*3/MM3 (ref 0–0.4)
EOSINOPHIL NFR BLD AUTO: 0.8 % (ref 0.3–6.2)
ERYTHROCYTE [DISTWIDTH] IN BLOOD BY AUTOMATED COUNT: 11.7 % (ref 12.3–15.4)
FOLATE SERPL-MCNC: 16.4 NG/ML (ref 4.78–24.2)
GLOBULIN UR ELPH-MCNC: 2.1 GM/DL
GLUCOSE SERPL-MCNC: 84 MG/DL (ref 65–99)
GLUCOSE UR STRIP-MCNC: NEGATIVE MG/DL
HCT VFR BLD AUTO: 38.4 % (ref 34–46.6)
HGB BLD-MCNC: 12.7 G/DL (ref 12–15.9)
IMM GRANULOCYTES # BLD AUTO: 0.02 10*3/MM3 (ref 0–0.05)
IMM GRANULOCYTES NFR BLD AUTO: 0.3 % (ref 0–0.5)
KETONES UR QL: NEGATIVE
LEUKOCYTE EST, POC: NEGATIVE
LYMPHOCYTES # BLD AUTO: 2.4 10*3/MM3 (ref 0.7–3.1)
LYMPHOCYTES NFR BLD AUTO: 37.3 % (ref 19.6–45.3)
MCH RBC QN AUTO: 33.1 PG (ref 26.6–33)
MCHC RBC AUTO-ENTMCNC: 33.1 G/DL (ref 31.5–35.7)
MCV RBC AUTO: 100 FL (ref 79–97)
MONOCYTES # BLD AUTO: 0.4 10*3/MM3 (ref 0.1–0.9)
MONOCYTES NFR BLD AUTO: 6.2 % (ref 5–12)
NEUTROPHILS NFR BLD AUTO: 3.54 10*3/MM3 (ref 1.7–7)
NEUTROPHILS NFR BLD AUTO: 54.9 % (ref 42.7–76)
NITRITE UR-MCNC: NEGATIVE MG/ML
NRBC BLD AUTO-RTO: 0 /100 WBC (ref 0–0.2)
PH UR: 7 [PH] (ref 5–8)
PLATELET # BLD AUTO: 226 10*3/MM3 (ref 140–450)
PMV BLD AUTO: 11.4 FL (ref 6–12)
POTASSIUM SERPL-SCNC: 4.2 MMOL/L (ref 3.5–5.2)
PROT SERPL-MCNC: 6.7 G/DL (ref 6–8.5)
PROT UR STRIP-MCNC: NEGATIVE MG/DL
RBC # BLD AUTO: 3.84 10*6/MM3 (ref 3.77–5.28)
RBC # UR STRIP: NEGATIVE /UL
SODIUM SERPL-SCNC: 138 MMOL/L (ref 136–145)
SP GR UR: 1.02 (ref 1–1.03)
TSH SERPL DL<=0.05 MIU/L-ACNC: 1.19 UIU/ML (ref 0.27–4.2)
UROBILINOGEN UR QL: NORMAL
VIT B12 BLD-MCNC: 349 PG/ML (ref 211–946)
WBC NRBC COR # BLD AUTO: 6.44 10*3/MM3 (ref 3.4–10.8)

## 2024-02-13 PROCEDURE — 82607 VITAMIN B-12: CPT | Performed by: FAMILY MEDICINE

## 2024-02-13 PROCEDURE — 80053 COMPREHEN METABOLIC PANEL: CPT | Performed by: FAMILY MEDICINE

## 2024-02-13 PROCEDURE — 84443 ASSAY THYROID STIM HORMONE: CPT | Performed by: FAMILY MEDICINE

## 2024-02-13 PROCEDURE — 82746 ASSAY OF FOLIC ACID SERUM: CPT | Performed by: FAMILY MEDICINE

## 2024-02-13 PROCEDURE — 85025 COMPLETE CBC W/AUTO DIFF WBC: CPT | Performed by: FAMILY MEDICINE

## 2024-02-13 NOTE — PROGRESS NOTES
Chief Complaint  Chief Complaint   Patient presents with    Toe Pain    Tinnitus       HPI:  Suzette Wilson presents to [facility]    Suzette Wilson date-of-birth 1950, presents for evaluation of multiple medical concerns.    About 1.5 years ago, she opened the side door to go outside and it lifted her Right big toenail. It was not hurting for a while, but it hurts when she wears shoes. It is not excruciating, but it is uncomfortable. She thinks the other toenail started growing up and the toenail started growing underneath it, so she thinks that is why it is thick.    She has ringing in her ears, more prominent in the right ear. She does not think she has it in her left ear. She holds the phone to her ear when she talks on the phone. She does not notice a difference in the sound on one side versus the other. She is right-handed. This has been going on for at least 3 months. She got sick in 11/2023 with a sinus infection. She thought she had COVID-19, but she tested negative for COVID-19. She was prescribed cefdinir and Astelin. She denies any nasal burning, dry mouth, nausea, fatigue, vertigo, or epistaxis She was not given a steroid injection.     She hit her head hard a long time ago, but not since she was sick. She fell on her head yesterday in the shower, and it hurt. She did not have any bad falls last year. She used to have headaches all the time, but she has not had one in a very long time. She has a lot of phlegm, which she thinks is allergies.        Procedures     Past History:  Medical History: has a past medical history of Acid reflux, Hemorrhoids, Hypertension, Medial meniscus tear (08/11/2016), and Osteoarthritis of left knee (08/24/2016).   Surgical History: has a past surgical history that includes Colonoscopy; Cystocele repair; Facelift; Esophagogastroduodenoscopy (2017); Rectal surgery; and Tonsillectomy (1968).   Family History: family history includes  Alzheimer's disease in her brother and sister; Stroke in her father and mother.   Social History: reports that she has never smoked. She has never been exposed to tobacco smoke. She has never used smokeless tobacco. She reports current alcohol use. She reports that she does not use drugs.    There is no immunization history on file for this patient.      Allergies: Ciprofloxacin     Medications:  Current Outpatient Medications on File Prior to Visit   Medication Sig Dispense Refill    alendronate (FOSAMAX) 35 MG tablet Take 1 tablet by mouth Every 7 (Seven) Days for 336 days. 12 tablet 3    estrogen, conjugated,-medroxyprogesterone (Prempro) 0.3-1.5 MG per tablet Take 1 tablet by mouth Daily for 180 days. 90 tablet 1    lisinopril (PRINIVIL,ZESTRIL) 20 MG tablet Take 1 tablet by mouth Daily for 360 days. 90 tablet 3    Magnesium 250 MG tablet Taking once or twice a week      metoprolol succinate XL (TOPROL-XL) 50 MG 24 hr tablet Take 1 tablet by mouth Daily for 360 days. 90 tablet 3    naloxone (NARCAN) 4 MG/0.1ML nasal spray       RABEprazole (ACIPHEX) 20 MG EC tablet Take 1 tablet by mouth Daily. 90 tablet 1    traMADol (ULTRAM) 50 MG tablet Take 1 tablet by mouth Every 12 (Twelve) Hours As Needed for Moderate Pain for up to 180 days. 180 tablet 1    meloxicam (MOBIC) 15 MG tablet Take 1 tablet by mouth Daily. (Patient not taking: Reported on 2/13/2024) 90 tablet 1     No current facility-administered medications on file prior to visit.        Health Maintenance Due   Topic Date Due    COVID-19 Vaccine (1) Never done    TDAP/TD VACCINES (1 - Tdap) Never done    ZOSTER VACCINE (1 of 2) Never done    RSV Vaccine - Adults (1 - 1-dose 60+ series) Never done    Pneumococcal Vaccine 65+ (1 of 1 - PCV) Never done    HEPATITIS C SCREENING  Never done    INFLUENZA VACCINE  Never done    MAMMOGRAM  12/29/2023       Vital Signs:   Vitals:    02/13/24 1323   BP: 126/68   Pulse: 69   Temp: 98.6 °F (37 °C)   SpO2: 98%   Weight:  "80.8 kg (178 lb 3.2 oz)   Height: 160 cm (63\")      Body mass index is 31.57 kg/m².     ROS:  Review of Systems   Constitutional:  Negative for fatigue and fever.   HENT:  Negative for congestion, ear pain and sinus pressure.    Respiratory:  Negative for cough, chest tightness and shortness of breath.    Cardiovascular:  Negative for chest pain, palpitations and leg swelling.   Gastrointestinal:  Negative for abdominal pain and diarrhea.   Genitourinary:  Negative for dysuria and frequency.   Neurological:  Negative for speech difficulty, headache and confusion.   Psychiatric/Behavioral:  Negative for agitation and behavioral problems.           Physical Exam  Vitals reviewed.   Constitutional:       Appearance: Normal appearance.   HENT:      Right Ear: Tympanic membrane normal.      Left Ear: Tympanic membrane normal.      Nose: Nose normal.   Eyes:      Extraocular Movements: Extraocular movements intact.      Conjunctiva/sclera: Conjunctivae normal.      Pupils: Pupils are equal, round, and reactive to light.   Cardiovascular:      Rate and Rhythm: Normal rate and regular rhythm.   Pulmonary:      Effort: Pulmonary effort is normal.      Breath sounds: Normal breath sounds.   Abdominal:      General: Bowel sounds are normal.   Musculoskeletal:         General: Normal range of motion.      Cervical back: Normal range of motion.   Skin:     General: Skin is warm and dry.   Neurological:      General: No focal deficit present.      Mental Status: She is alert and oriented to person, place, and time.   Psychiatric:         Mood and Affect: Mood normal.         Behavior: Behavior normal.          Result Review        Diagnoses and all orders for this visit:    1. Primary hypertension (Primary)  -     Comprehensive Metabolic Panel    2. Memory loss  -     MRI Brain Without Contrast; Future  -     TSH  -     Vitamin B12 & Folate  -     POCT urinalysis dipstick, manual    3. Tinnitus of right ear  -     MRI Brain " Without Contrast; Future    4. Abnormal CBC  -     CBC Auto Differential    5. Ingrown right big toenail  -     Ambulatory Referral to Podiatry    1. Ingrown toenail.  Her toenail is growing narrow and cutting in.   - I will check her urine to make sure she does not have an infection.    2. Tinnitus.  - It could be a sign of an inner ear problem. It could be a medication issue. If it is one ear over the other, that makes me think it is more of an actual physical problem.    3. Memory loss.  - She is struggling more than what she needs to be to remember.   - She might have had head trauma.   - I will get a brain MRI.   - I will check her blood work. I will check thyroid and B12. If everything else is negative, we will do a Mini-Mental exam.    Follow-up  She will follow up in 1 month.            Smoking Cessation:    Suzette Wilson  reports that she has never smoked. She has never been exposed to tobacco smoke. She has never used smokeless tobacco.     Follow Up   Return in about 4 weeks (around 3/12/2024).  Patient was given instructions and counseling regarding her condition or for health maintenance advice. Please see specific information pulled into the AVS if appropriate.       Isis Ordonez MD      Transcribed from ambient dictation for Isis Ordonez MD by Kermit Valente.  02/13/24   14:47 EST    Patient or patient representative verbalized consent to the visit recording.  I have personally performed the services described in this document as transcribed by the above individual, and it is both accurate and complete.

## 2024-02-15 DIAGNOSIS — F33.8 SEASONAL AFFECTIVE DISORDER: ICD-10-CM

## 2024-02-15 DIAGNOSIS — R45.86 MOOD SWINGS: ICD-10-CM

## 2024-02-15 RX ORDER — BUPROPION HYDROCHLORIDE 150 MG/1
150 TABLET ORAL DAILY
Qty: 90 TABLET | Refills: 1 | Status: SHIPPED | OUTPATIENT
Start: 2024-02-15 | End: 2024-02-15 | Stop reason: SDUPTHER

## 2024-02-15 RX ORDER — BUPROPION HYDROCHLORIDE 150 MG/1
150 TABLET ORAL DAILY
Qty: 90 TABLET | Refills: 1 | Status: SHIPPED | OUTPATIENT
Start: 2024-02-15 | End: 2024-08-13

## 2024-02-20 ENCOUNTER — TELEPHONE (OUTPATIENT)
Dept: FAMILY MEDICINE CLINIC | Facility: CLINIC | Age: 74
End: 2024-02-20
Payer: MEDICARE

## 2024-02-20 DIAGNOSIS — R45.86 MOOD SWINGS: ICD-10-CM

## 2024-02-20 DIAGNOSIS — F33.8 SEASONAL AFFECTIVE DISORDER: ICD-10-CM

## 2024-02-20 RX ORDER — BUPROPION HYDROCHLORIDE 150 MG/1
150 TABLET ORAL DAILY
Qty: 90 TABLET | Refills: 1 | Status: SHIPPED | OUTPATIENT
Start: 2024-02-20 | End: 2024-08-18

## 2024-02-26 DIAGNOSIS — K21.00 GASTROESOPHAGEAL REFLUX DISEASE WITH ESOPHAGITIS WITHOUT HEMORRHAGE: ICD-10-CM

## 2024-02-26 RX ORDER — RABEPRAZOLE SODIUM 20 MG/1
20 TABLET, DELAYED RELEASE ORAL DAILY
Qty: 90 TABLET | Refills: 1 | Status: SHIPPED | OUTPATIENT
Start: 2024-02-26

## 2024-02-26 NOTE — TELEPHONE ENCOUNTER
Caller: Suzette Wilson    Relationship: Self    Best call back number: [phone]     Requested Prescriptions:   Requested Prescriptions     Pending Prescriptions Disp Refills    RABEprazole (ACIPHEX) 20 MG EC tablet 90 tablet 1     Sig: Take 1 tablet by mouth Daily.        Pharmacy where request should be sent: [place] [phone] [facility] [phone]      Last office visit with prescribing clinician: 2/13/2024   Last telemedicine visit with prescribing clinician: Visit date not found   Next office visit with prescribing clinician: 3/19/2024     Additional details provided by patient: PATIENT TOOK LAST DOSE 2.24.24    Does the patient have less than a 3 day supply:  [x] Yes  [] No    Would you like a call back once the refill request has been completed: [] Yes [] No    If the office needs to give you a call back, can they leave a voicemail: [] Yes [] No    Luanne Britton, PCT   02/26/24 09:10 EST

## 2024-03-13 ENCOUNTER — OFFICE VISIT (OUTPATIENT)
Dept: PODIATRY | Facility: CLINIC | Age: 74
End: 2024-03-13
Payer: MEDICARE

## 2024-03-13 VITALS
WEIGHT: 177 LBS | TEMPERATURE: 98.2 F | DIASTOLIC BLOOD PRESSURE: 66 MMHG | BODY MASS INDEX: 31.36 KG/M2 | SYSTOLIC BLOOD PRESSURE: 129 MMHG | HEIGHT: 63 IN | HEART RATE: 56 BPM | OXYGEN SATURATION: 97 %

## 2024-03-13 DIAGNOSIS — B35.1 ONYCHOMYCOSIS: Primary | ICD-10-CM

## 2024-03-13 DIAGNOSIS — M79.671 RIGHT FOOT PAIN: ICD-10-CM

## 2024-03-13 NOTE — PROGRESS NOTES
[facility] - PODIATRY    Today's Date: 03/13/24    Patient Name: Suzette Wilson  MRN: [id number]  CSN: [id number]  PCP: Isis Ordonez MD,  Referring Provider: Isis Ordonez MD    SUBJECTIVE     Chief Complaint   Patient presents with    Right Foot - Ingrown Toenail, Establish Care     States as a teenager had this nail removed States 1 year ago injured the nail when she struck it on the bottom of a door nail lifted up the nail  a new nail grew underneath  Bothers her to wear shoes     HPI: Suzette Wilson, a 73 y.o.female, presents to clinic.    Patient is a 73-year-old female presenting with right great toe pain.  Patient states that she had her toenail removed when she was a child and it grew back.  Patient states she recently injured her great toenail and lifted the nail up and now when she is walking she is having pain to the top of the nail and it is gotten thickened.  Patient states this has been going on for several months.    Past Medical History:   Diagnosis Date    Acid reflux     Hemorrhoids     Hypertension     Medial meniscus tear 08/11/2016    right knee    Osteoarthritis of left knee 08/24/2016     Past Surgical History:   Procedure Laterality Date    COLONOSCOPY      2007, 2017    CYSTOCELE REPAIR      ENDOSCOPY  2017    FACELIFT      DEEP PLANE     RECTAL SURGERY      Rectocele    TONSILLECTOMY  1968     Family History   Problem Relation Age of Onset    Stroke Mother     Stroke Father     Alzheimer's disease Sister     Alzheimer's disease Brother      Social History     Socioeconomic History    Marital status:    Tobacco Use    Smoking status: Never     Passive exposure: Never    Smokeless tobacco: Never   Vaping Use    Vaping status: Never Used   Substance and Sexual Activity    Alcohol use: Yes    Drug use: Never    Sexual activity: Defer     Allergies   Allergen Reactions    Ciprofloxacin Nausea And Vomiting     Current Outpatient Medications   Medication Sig  Dispense Refill    alendronate (FOSAMAX) 35 MG tablet Take 1 tablet by mouth Every 7 (Seven) Days for 336 days. 12 tablet 3    buPROPion XL (Wellbutrin XL) 150 MG 24 hr tablet Take 1 tablet by mouth Daily for 180 days. 90 tablet 1    estrogen, conjugated,-medroxyprogesterone (Prempro) 0.3-1.5 MG per tablet Take 1 tablet by mouth Daily for 180 days. 90 tablet 1    lisinopril (PRINIVIL,ZESTRIL) 20 MG tablet Take 1 tablet by mouth Daily for 360 days. 90 tablet 3    Magnesium 250 MG tablet Taking once or twice a week      meloxicam (MOBIC) 15 MG tablet Take 1 tablet by mouth Daily. 90 tablet 1    metoprolol succinate XL (TOPROL-XL) 50 MG 24 hr tablet Take 1 tablet by mouth Daily for 360 days. 90 tablet 3    naloxone (NARCAN) 4 MG/0.1ML nasal spray       RABEprazole (ACIPHEX) 20 MG EC tablet Take 1 tablet by mouth Daily. 90 tablet 1    traMADol (ULTRAM) 50 MG tablet Take 1 tablet by mouth Every 12 (Twelve) Hours As Needed for Moderate Pain for up to 180 days. 180 tablet 1     No current facility-administered medications for this visit.     Review of Systems   Constitutional: Negative.    HENT: Negative.     Eyes: Negative.    Respiratory: Negative.     Cardiovascular: Negative.    Gastrointestinal: Negative.    Endocrine: Negative.    Genitourinary: Negative.    Musculoskeletal: Negative.    Skin: Negative.         Right great toe pain   Allergic/Immunologic: Negative.    Neurological: Negative.    Hematological: Negative.    Psychiatric/Behavioral: Negative.     All other systems reviewed and are negative.      OBJECTIVE     Vitals:    03/13/24 0855   BP: 129/66   Pulse: 56   Temp: 98.2 °F (36.8 °C)   SpO2: 97%       WBC   Date Value Ref Range Status   02/13/2024 6.44 3.40 - 10.80 10*3/mm3 Final     RBC   Date Value Ref Range Status   02/13/2024 3.84 3.77 - 5.28 10*6/mm3 Final     Hemoglobin   Date Value Ref Range Status   02/13/2024 12.7 12.0 - 15.9 g/dL Final   11/08/2018 13.3 12.0 - 17.0 g/dL Final     Hematocrit    Date Value Ref Range Status   02/13/2024 38.4 34.0 - 46.6 % Final   11/08/2018 39.0 36.0 - 46.0 % Final     MCV   Date Value Ref Range Status   02/13/2024 100.0 (H) 79.0 - 97.0 fL Final     MCH   Date Value Ref Range Status   02/13/2024 33.1 (H) 26.6 - 33.0 pg Final     MCHC   Date Value Ref Range Status   02/13/2024 33.1 31.5 - 35.7 g/dL Final     RDW   Date Value Ref Range Status   02/13/2024 11.7 (L) 12.3 - 15.4 % Final     RDW-SD   Date Value Ref Range Status   02/13/2024 42.3 37.0 - 54.0 fl Final     MPV   Date Value Ref Range Status   02/13/2024 11.4 6.0 - 12.0 fL Final     Platelets   Date Value Ref Range Status   02/13/2024 226 140 - 450 10*3/mm3 Final     Neutrophil %   Date Value Ref Range Status   02/13/2024 54.9 42.7 - 76.0 % Final     Lymphocyte %   Date Value Ref Range Status   02/13/2024 37.3 19.6 - 45.3 % Final     Monocyte %   Date Value Ref Range Status   02/13/2024 6.2 5.0 - 12.0 % Final     Eosinophil %   Date Value Ref Range Status   02/13/2024 0.8 0.3 - 6.2 % Final     Basophil %   Date Value Ref Range Status   02/13/2024 0.5 0.0 - 1.5 % Final     Immature Grans %   Date Value Ref Range Status   02/13/2024 0.3 0.0 - 0.5 % Final     Neutrophils, Absolute   Date Value Ref Range Status   02/13/2024 3.54 1.70 - 7.00 10*3/mm3 Final     Lymphocytes, Absolute   Date Value Ref Range Status   02/13/2024 2.40 0.70 - 3.10 10*3/mm3 Final     Monocytes, Absolute   Date Value Ref Range Status   02/13/2024 0.40 0.10 - 0.90 10*3/mm3 Final     Eosinophils, Absolute   Date Value Ref Range Status   02/13/2024 0.05 0.00 - 0.40 10*3/mm3 Final     Basophils, Absolute   Date Value Ref Range Status   02/13/2024 0.03 0.00 - 0.20 10*3/mm3 Final     Immature Grans, Absolute   Date Value Ref Range Status   02/13/2024 0.02 0.00 - 0.05 10*3/mm3 Final     nRBC   Date Value Ref Range Status   02/13/2024 0.0 0.0 - 0.2 /100 WBC Final         Lab Results   Component Value Date    GLUCOSE 84 02/13/2024    BUN 15 02/13/2024     CREATININE 0.91 02/13/2024    EGFRIFNONA 65 11/08/2021    BCR 16.5 02/13/2024    K 4.2 02/13/2024    CO2 25.0 02/13/2024    CALCIUM 8.9 02/13/2024    ALBUMIN 4.6 02/13/2024    LABIL2 1.4 04/29/2021    AST 21 02/13/2024    ALT 12 02/13/2024       Patient seen in no apparent distress.      PHYSICAL EXAM:     Foot/Ankle Exam    GENERAL  Appearance:  appears stated age  Orientation:  AAOx3  Affect:  appropriate  Gait:  unimpaired  Assistance:  independent  Right shoe gear: casual shoe  Left shoe gear: casual shoe    VASCULAR     Right Foot Vascularity   Normal vascular exam    Dorsalis pedis:  2+  Posterior tibial:  2+  Skin temperature:  warm  Edema grading:  None  CFT:  < 3 seconds  Pedal hair growth:  Present  Varicosities:  none     Left Foot Vascularity   Normal vascular exam    Dorsalis pedis:  2+  Posterior tibial:  2+  Skin temperature:  warm  Edema grading:  None  CFT:  < 3 seconds  Pedal hair growth:  Present  Varicosities:  none     NEUROLOGIC     Right Foot Neurologic   Normal sensation    Light touch sensation: normal  Vibratory sensation: normal  Hot/Cold sensation: normal  Protective Sensation using [place] Monofilament:   Sites intact: 10  Sites tested: 10     Left Foot Neurologic   Normal sensation    Light touch sensation: normal  Vibratory sensation: normal  Hot/Cold sensation:  normal  Protective Sensation using [place] Monofilament:   Sites intact: 10  Sites tested: 10    MUSCLE STRENGTH     Right Foot Muscle Strength   Foot dorsiflexion:  4  Foot plantar flexion:  4  Foot inversion:  4  Foot eversion:  4     Left Foot Muscle Strength   Foot dorsiflexion:  4  Foot plantar flexion:  4  Foot inversion:  4  Foot eversion:  4    RANGE OF MOTION     Right Foot Range of Motion   Foot and ankle ROM within normal limits       Left Foot Range of Motion   Foot and ankle ROM within normal limits      DERMATOLOGIC      Right Foot Dermatologic   Skin  Right foot skin is intact.   Nails  1.   Positive for abnormal thickness and dystrophic nail.     Left Foot Dermatologic   Skin  Left foot skin is intact.       RADIOLOGY:        No results found.    ASSESSMENT/PLAN     Diagnoses and all orders for this visit:    1. Onychomycosis (Primary)    2. Right foot pain        Comprehensive lower extremity examination and evaluation was performed.    Discussed permanent removal of the right great toenail.  Patient does not want to have this done at this time.  Discussed routine trimming and dribbling to the right great toe.  Patient return to clinic if she would like to have the toenail permanently removed.    Discussed findings and treatment plan including risks, benefits, and treatment options with patient in detail. Patient agreed with treatment plan.    Medications and allergies reviewed.  Reviewed available lab values along with other pertinent labs.  These were discussed with the patient.    An After Visit Summary was printed and given to the patient at discharge, including (if requested) any available informative/educational handouts regarding diagnosis, treatment, or medications. All questions were answered to patient/family satisfaction. Should symptoms fail to improve or worsen they agree to call or return to clinic or to go to the Emergency Department. Discussed the importance of following up with any needed screening tests/labs/specialist appointments and any requested follow-up recommended by me today. Importance of maintaining follow-up discussed and patient accepts that missed appointments can delay diagnosis and potentially lead to worsening of conditions.    No follow-ups on file., or sooner if acute issues arise.    This document has been electronically signed by Deric Guzman DPM on March 13, 2024 10:39 EDT

## 2024-03-19 ENCOUNTER — OFFICE VISIT (OUTPATIENT)
Dept: FAMILY MEDICINE CLINIC | Facility: CLINIC | Age: 74
End: 2024-03-19
Payer: MEDICARE

## 2024-03-19 VITALS
BODY MASS INDEX: 31.54 KG/M2 | SYSTOLIC BLOOD PRESSURE: 140 MMHG | HEART RATE: 55 BPM | OXYGEN SATURATION: 98 % | DIASTOLIC BLOOD PRESSURE: 80 MMHG | WEIGHT: 178 LBS | HEIGHT: 63 IN | TEMPERATURE: 98 F

## 2024-03-19 DIAGNOSIS — I10 ESSENTIAL HYPERTENSION: ICD-10-CM

## 2024-03-19 DIAGNOSIS — I10 PRIMARY HYPERTENSION: ICD-10-CM

## 2024-03-19 DIAGNOSIS — Z12.31 BREAST CANCER SCREENING BY MAMMOGRAM: ICD-10-CM

## 2024-03-19 DIAGNOSIS — R79.89 LOW VITAMIN B12 LEVEL: Primary | ICD-10-CM

## 2024-03-19 DIAGNOSIS — L60.0 INGROWN RIGHT BIG TOENAIL: ICD-10-CM

## 2024-03-19 RX ORDER — CYANOCOBALAMIN 1000 UG/ML
1000 INJECTION, SOLUTION INTRAMUSCULAR; SUBCUTANEOUS ONCE
Status: COMPLETED | OUTPATIENT
Start: 2024-03-19 | End: 2024-03-19

## 2024-03-19 RX ORDER — METOPROLOL SUCCINATE 50 MG/1
50 TABLET, EXTENDED RELEASE ORAL DAILY
Qty: 90 TABLET | Refills: 3 | Status: SHIPPED | OUTPATIENT
Start: 2024-03-19 | End: 2025-03-14

## 2024-03-19 RX ADMIN — CYANOCOBALAMIN 1000 MCG: 1000 INJECTION, SOLUTION INTRAMUSCULAR; SUBCUTANEOUS at 14:44

## 2024-03-19 NOTE — PROGRESS NOTES
Chief Complaint  Chief Complaint   Patient presents with    Follow-up       HPI:  Suzette Wilson presents to [facility]    Pt reports that she went to see Dr. Guzman who said he can cut out the corners that are real deep and she has a thickened toenail. Pt was told that it will eventually grow back in a fan formation which causes it to recur.     HTN- Pt BP today is 140/80 which is well controlled.  Pt is compliant with their medication and will continue their current medication regimen. No side effects to the medication.     Procedures     Past History:  Medical History: has a past medical history of Acid reflux, Anxiety, Depression, Hemorrhoids, Hypertension, Medial meniscus tear (08/11/2016), and Osteoarthritis of left knee (08/24/2016).   Surgical History: has a past surgical history that includes Colonoscopy; Cystocele repair; Facelift; Esophagogastroduodenoscopy (2017); Rectal surgery; Tonsillectomy (1968); and Tubal ligation (1973).   Family History: family history includes Alzheimer's disease in her brother and sister; Mental illness in her father; Stroke in her father and mother.   Social History: reports that she has never smoked. She has never been exposed to tobacco smoke. She has never used smokeless tobacco. She reports current alcohol use. She reports that she does not use drugs.    There is no immunization history on file for this patient.      Allergies: Ciprofloxacin     Medications:  Current Outpatient Medications on File Prior to Visit   Medication Sig Dispense Refill    alendronate (FOSAMAX) 35 MG tablet Take 1 tablet by mouth Every 7 (Seven) Days for 336 days. 12 tablet 3    buPROPion XL (Wellbutrin XL) 150 MG 24 hr tablet Take 1 tablet by mouth Daily for 180 days. 90 tablet 1    estrogen, conjugated,-medroxyprogesterone (Prempro) 0.3-1.5 MG per tablet Take 1 tablet by mouth Daily for 180 days. 90 tablet 1    lisinopril (PRINIVIL,ZESTRIL) 20 MG tablet Take  "1 tablet by mouth Daily for 360 days. 90 tablet 3    Magnesium 250 MG tablet Taking once or twice a week      meloxicam (MOBIC) 15 MG tablet Take 1 tablet by mouth Daily. 90 tablet 1    naloxone (NARCAN) 4 MG/0.1ML nasal spray       RABEprazole (ACIPHEX) 20 MG EC tablet Take 1 tablet by mouth Daily. 90 tablet 1     No current facility-administered medications on file prior to visit.        Health Maintenance Due   Topic Date Due    TDAP/TD VACCINES (1 - Tdap) Never done    ZOSTER VACCINE (1 of 2) Never done    RSV Vaccine - Adults (1 - 1-dose 60+ series) Never done    Pneumococcal Vaccine 65+ (1 of 1 - PCV) Never done    HEPATITIS C SCREENING  Never done    COVID-19 Vaccine (1 - 2023-24 season) Never done       Vital Signs:   Vitals:    03/19/24 1148   BP: 140/80   BP Location: Left arm   Patient Position: Sitting   Cuff Size: Adult   Pulse: 55   Temp: 98 °F (36.7 °C)   SpO2: 98%   Weight: 80.7 kg (178 lb)   Height: 160 cm (63\")      Body mass index is 31.53 kg/m².     ROS:  Review of Systems   Constitutional:  Negative for fatigue and fever.   HENT:  Negative for congestion, ear pain and sinus pressure.    Respiratory:  Negative for cough, chest tightness and shortness of breath.    Cardiovascular:  Negative for chest pain, palpitations and leg swelling.   Gastrointestinal:  Negative for abdominal pain and diarrhea.   Genitourinary:  Negative for dysuria and frequency.   Neurological:  Negative for speech difficulty, headache and confusion.   Psychiatric/Behavioral:  Negative for agitation and behavioral problems.           Physical Exam  Vitals reviewed.   Constitutional:       Appearance: Normal appearance.   HENT:      Right Ear: Tympanic membrane normal.      Left Ear: Tympanic membrane normal.      Nose: Nose normal.   Eyes:      Extraocular Movements: Extraocular movements intact.      Conjunctiva/sclera: Conjunctivae normal.      Pupils: Pupils are equal, round, and reactive to light.   Cardiovascular:      " Rate and Rhythm: Normal rate and regular rhythm.   Pulmonary:      Effort: Pulmonary effort is normal.      Breath sounds: Normal breath sounds.   Abdominal:      General: Bowel sounds are normal.   Musculoskeletal:         General: Normal range of motion.      Cervical back: Normal range of motion.   Skin:     General: Skin is warm and dry.   Neurological:      General: No focal deficit present.      Mental Status: She is alert and oriented to person, place, and time.   Psychiatric:         Mood and Affect: Mood normal.         Behavior: Behavior normal.          Result Review        Diagnoses and all orders for this visit:    1. Low vitamin B12 level (Primary)  -     cyanocobalamin injection 1,000 mcg    2. Essential hypertension  -     metoprolol succinate XL (TOPROL-XL) 50 MG 24 hr tablet; Take 1 tablet by mouth Daily for 360 days.  Dispense: 90 tablet; Refill: 3    3. Breast cancer screening by mammogram  -     Mammo Screening Digital Tomosynthesis Bilateral With CAD; Future    4. Primary hypertension  Assessment & Plan:  Hypertension is stable and controlled  Continue current treatment regimen.  Blood pressure will be reassessed in 3 months.      5. Ingrown right big toenail                Smoking Cessation:    Suzette LIZA Wilson  reports that she has never smoked. She has never been exposed to tobacco smoke. She has never used smokeless tobacco.        Follow Up   Return in about 3 months (around 6/19/2024).  Patient was given instructions and counseling regarding her condition or for health maintenance advice. Please see specific information pulled into the AVS if appropriate.       Isis Ordonez MD

## 2024-03-22 ENCOUNTER — HOSPITAL ENCOUNTER (OUTPATIENT)
Dept: MRI IMAGING | Facility: HOSPITAL | Age: 74
Discharge: HOME | End: 2024-03-22
Payer: MEDICARE

## 2024-03-22 DIAGNOSIS — H93.11 TINNITUS OF RIGHT EAR: ICD-10-CM

## 2024-03-22 DIAGNOSIS — R41.3 MEMORY LOSS: ICD-10-CM

## 2024-03-22 PROCEDURE — 70551 MRI BRAIN STEM W/O DYE: CPT

## 2024-03-27 ENCOUNTER — HOSPITAL ENCOUNTER (OUTPATIENT)
Dept: MAMMOGRAPHY | Facility: HOSPITAL | Age: 74
Discharge: HOME | End: 2024-03-27
Admitting: FAMILY MEDICINE
Payer: MEDICARE

## 2024-03-27 DIAGNOSIS — Z12.31 BREAST CANCER SCREENING BY MAMMOGRAM: ICD-10-CM

## 2024-03-27 PROCEDURE — 77063 BREAST TOMOSYNTHESIS BI: CPT

## 2024-03-27 PROCEDURE — 77067 SCR MAMMO BI INCL CAD: CPT

## 2024-05-15 DIAGNOSIS — R45.86 MOOD SWINGS: ICD-10-CM

## 2024-05-15 DIAGNOSIS — F33.8 SEASONAL AFFECTIVE DISORDER: ICD-10-CM

## 2024-05-15 RX ORDER — MULTIVITAMIN WITH IRON
1 TABLET ORAL DAILY
Qty: 90 EACH | Refills: 0 | Status: SHIPPED | OUTPATIENT
Start: 2024-05-15

## 2024-05-15 RX ORDER — BUPROPION HYDROCHLORIDE 150 MG/1
150 TABLET ORAL DAILY
Qty: 90 TABLET | Refills: 1 | Status: SHIPPED | OUTPATIENT
Start: 2024-05-15 | End: 2024-11-11

## 2024-06-03 DIAGNOSIS — G57.01 SCIATIC NERVE DISEASE, RIGHT: ICD-10-CM

## 2024-06-03 DIAGNOSIS — K21.00 GASTROESOPHAGEAL REFLUX DISEASE WITH ESOPHAGITIS WITHOUT HEMORRHAGE: ICD-10-CM

## 2024-06-03 RX ORDER — RABEPRAZOLE SODIUM 20 MG/1
20 TABLET, DELAYED RELEASE ORAL DAILY
Qty: 90 TABLET | Refills: 1 | Status: SHIPPED | OUTPATIENT
Start: 2024-06-03

## 2024-06-03 NOTE — TELEPHONE ENCOUNTER
Caller: Suzette Wilson    Relationship: Self    Best call back number: [phone]     Requested Prescriptions:   Requested Prescriptions     Pending Prescriptions Disp Refills    RABEprazole (ACIPHEX) 20 MG EC tablet 90 tablet 1     Sig: Take 1 tablet by mouth Daily.    traMADol (ULTRAM) 50 MG tablet 180 tablet 1     Sig: Take 1 tablet by mouth Every 12 (Twelve) Hours As Needed for Moderate Pain for up to 180 days.        Pharmacy where request should be sent: [facility] [phone] [place], [place] [id number]      Last office visit with prescribing clinician: 3/19/2024   Last telemedicine visit with prescribing clinician: Visit date not found   Next office visit with prescribing clinician: 6/18/2024       Does the patient have less than a 3 day supply:  [] Yes  [x] No      Khanh Fernandez Rep   06/03/24 10:18 EDT

## 2024-06-04 RX ORDER — TRAMADOL HYDROCHLORIDE 50 MG/1
50 TABLET ORAL EVERY 12 HOURS PRN
Qty: 180 TABLET | Refills: 1 | Status: SHIPPED | OUTPATIENT
Start: 2024-06-04 | End: 2024-12-01

## 2024-06-06 ENCOUNTER — TELEPHONE (OUTPATIENT)
Dept: FAMILY MEDICINE CLINIC | Facility: CLINIC | Age: 74
End: 2024-06-06
Payer: MEDICARE

## 2024-06-06 NOTE — TELEPHONE ENCOUNTER
Called and spoke with patient advising her that her prescription for tramadol is ready for her to  here in the office at her convenience.  Patient verbally stated understanding.

## 2024-06-18 ENCOUNTER — OFFICE VISIT (OUTPATIENT)
Dept: FAMILY MEDICINE CLINIC | Facility: CLINIC | Age: 74
End: 2024-06-18
Payer: MEDICARE

## 2024-06-18 VITALS
SYSTOLIC BLOOD PRESSURE: 138 MMHG | TEMPERATURE: 98.3 F | DIASTOLIC BLOOD PRESSURE: 88 MMHG | RESPIRATION RATE: 18 BRPM | HEIGHT: 63 IN | WEIGHT: 174.6 LBS | HEART RATE: 59 BPM | BODY MASS INDEX: 30.94 KG/M2 | OXYGEN SATURATION: 99 %

## 2024-06-18 DIAGNOSIS — N95.1 VAGINAL DRYNESS, MENOPAUSAL: ICD-10-CM

## 2024-06-18 DIAGNOSIS — Z78.0 OSTEOPENIA AFTER MENOPAUSE: ICD-10-CM

## 2024-06-18 DIAGNOSIS — M85.80 OSTEOPENIA AFTER MENOPAUSE: ICD-10-CM

## 2024-06-18 DIAGNOSIS — G57.01 SCIATIC NERVE DISEASE, RIGHT: ICD-10-CM

## 2024-06-18 DIAGNOSIS — I10 PRIMARY HYPERTENSION: ICD-10-CM

## 2024-06-18 DIAGNOSIS — R79.89 LOW VITAMIN B12 LEVEL: Primary | ICD-10-CM

## 2024-06-18 PROCEDURE — 1160F RVW MEDS BY RX/DR IN RCRD: CPT | Performed by: FAMILY MEDICINE

## 2024-06-18 PROCEDURE — 1125F AMNT PAIN NOTED PAIN PRSNT: CPT | Performed by: FAMILY MEDICINE

## 2024-06-18 PROCEDURE — 3079F DIAST BP 80-89 MM HG: CPT | Performed by: FAMILY MEDICINE

## 2024-06-18 PROCEDURE — 96372 THER/PROPH/DIAG INJ SC/IM: CPT | Performed by: FAMILY MEDICINE

## 2024-06-18 PROCEDURE — 3075F SYST BP GE 130 - 139MM HG: CPT | Performed by: FAMILY MEDICINE

## 2024-06-18 PROCEDURE — 99214 OFFICE O/P EST MOD 30 MIN: CPT | Performed by: FAMILY MEDICINE

## 2024-06-18 PROCEDURE — 1159F MED LIST DOCD IN RCRD: CPT | Performed by: FAMILY MEDICINE

## 2024-06-18 RX ORDER — ALENDRONATE SODIUM 35 MG/1
35 TABLET ORAL
Qty: 12 TABLET | Refills: 3 | Status: SHIPPED | OUTPATIENT
Start: 2024-06-18 | End: 2025-05-20

## 2024-06-18 RX ORDER — TRAMADOL HYDROCHLORIDE 50 MG/1
50 TABLET ORAL EVERY 12 HOURS PRN
Qty: 180 TABLET | Refills: 1 | Status: SHIPPED | OUTPATIENT
Start: 2024-06-18 | End: 2024-06-18

## 2024-06-18 RX ORDER — CYANOCOBALAMIN 1000 UG/ML
1000 INJECTION, SOLUTION INTRAMUSCULAR; SUBCUTANEOUS
Status: SHIPPED | OUTPATIENT
Start: 2024-06-18

## 2024-06-18 RX ORDER — ALENDRONATE SODIUM 35 MG/1
35 TABLET ORAL
Qty: 12 TABLET | Refills: 3 | Status: SHIPPED | OUTPATIENT
Start: 2024-06-18 | End: 2024-06-18

## 2024-06-18 RX ORDER — TRAMADOL HYDROCHLORIDE 50 MG/1
50 TABLET ORAL EVERY 12 HOURS PRN
Qty: 180 TABLET | Refills: 1 | Status: SHIPPED | OUTPATIENT
Start: 2024-06-18 | End: 2024-12-15

## 2024-06-18 RX ADMIN — CYANOCOBALAMIN 1000 MCG: 1000 INJECTION, SOLUTION INTRAMUSCULAR; SUBCUTANEOUS at 11:04

## 2024-06-18 NOTE — PROGRESS NOTES
Chief Complaint  B12 Level (3mfu)    Subjective        Suzette Wilson presents to [facility]  History of Present Illness  The patient presents for evaluation of multiple medical concerns.    The patient is currently not on any vitamin supplements, having received a B12 injection during her last visit. She maintains an active lifestyle, waking up between 5:00 and 5:30 a.m. and completing her daily activities by 6:00 a.m. During the summer, she typically sleeps until 6:30 or 7:00 a.m., but during the winter, she spends a significant amount of time in bed.    The patient requires a refill of her tramadol prescription, having 4 refills remaining on her empty bottle. Initially, she used tramadol 2 to 3 times a week, but currently, she uses it daily. If she does not take it, she experiences severe knee pain.    The patient's gastroesophageal reflux disease is well-managed with her reflux medication. She occasionally forgets to take her medication.    The patient is on alendronate, having 2 boxes of medication remaining.    The patient has never received the pneumonia vaccine.    Past Medical History:   Diagnosis Date    Acid reflux     Anxiety     Depression     Hemorrhoids     Hypertension     Medial meniscus tear 08/11/2016    right knee    Osteoarthritis of left knee 08/24/2016      Family History   Problem Relation Age of Onset    Stroke Mother     Stroke Father     Mental illness Father         Alzheimers    Alzheimer's disease Sister     Alzheimer's disease Brother       Social History     Socioeconomic History    Marital status:    Tobacco Use    Smoking status: Never     Passive exposure: Never    Smokeless tobacco: Never   Vaping Use    Vaping status: Never Used   Substance and Sexual Activity    Alcohol use: Yes    Drug use: Never    Sexual activity: Yes     Comment:  only        Objective   Vital Signs:  /88 (BP Location: Left arm, Patient Position:  "Sitting, Cuff Size: Adult)   Pulse 59   Temp 98.3 °F (36.8 °C) (Oral)   Resp 18   Ht 160 cm (63\")   Wt 79.2 kg (174 lb 9.6 oz)   SpO2 99%   BMI 30.93 kg/m²   Estimated body mass index is 30.93 kg/m² as calculated from the following:    Height as of this encounter: 160 cm (63\").    Weight as of this encounter: 79.2 kg (174 lb 9.6 oz).      Review of Systems   Constitutional:  Negative for activity change, chills, fatigue and fever.   HENT:  Negative for congestion, sinus pressure, sinus pain and sore throat.    Eyes:  Negative for discharge and redness.   Respiratory:  Negative for cough and chest tightness.    Cardiovascular:  Negative for chest pain, palpitations and leg swelling.   Gastrointestinal:  Negative for abdominal pain and diarrhea.   Endocrine: Negative for cold intolerance and heat intolerance.   Genitourinary:  Negative for difficulty urinating and dysuria.   Musculoskeletal:  Negative for gait problem and neck stiffness.   Skin:  Negative for pallor and rash.   Neurological:  Negative for dizziness and headaches.   Psychiatric/Behavioral:  Negative for agitation, behavioral problems and confusion.        Physical Exam  Vitals reviewed.   Constitutional:       Appearance: Normal appearance.   HENT:      Right Ear: Tympanic membrane normal.      Left Ear: Tympanic membrane normal.      Nose: Nose normal.   Eyes:      Extraocular Movements: Extraocular movements intact.      Conjunctiva/sclera: Conjunctivae normal.      Pupils: Pupils are equal, round, and reactive to light.   Cardiovascular:      Rate and Rhythm: Normal rate and regular rhythm.   Pulmonary:      Effort: Pulmonary effort is normal.      Breath sounds: Normal breath sounds.   Abdominal:      General: Bowel sounds are normal.   Musculoskeletal:         General: Normal range of motion.      Cervical back: Normal range of motion.   Skin:     General: Skin is warm and dry.   Neurological:      General: No focal deficit present.      " Mental Status: She is alert and oriented to person, place, and time.   Psychiatric:         Mood and Affect: Mood normal.         Behavior: Behavior normal.          Physical Exam  Vital Signs  Vitals show a blood pressure of 138/88.      Result Review       Results  Laboratory Studies  B12 was low. Thyroid levels were good.    Imaging  Mammogram was normal. Brain MRI was normal.             Assessment and Plan     Diagnoses and all orders for this visit:    1. Low vitamin B12 level (Primary)  -     cyanocobalamin injection 1,000 mcg  -     Discontinue: Cyanocobalamin 2500 MCG chewable tablet; Chew 1 tablet Daily for 90 days.  Dispense: 90 tablet; Refill: 1    2. Sciatic nerve disease, right  -     Discontinue: traMADol (ULTRAM) 50 MG tablet; Take 1 tablet by mouth Every 12 (Twelve) Hours As Needed for Moderate Pain for up to 180 days.  Dispense: 180 tablet; Refill: 1  -     traMADol (ULTRAM) 50 MG tablet; Take 1 tablet by mouth Every 12 (Twelve) Hours As Needed for Moderate Pain for up to 180 days.  Dispense: 180 tablet; Refill: 1    3. Primary hypertension    4. Osteopenia after menopause  -     Discontinue: alendronate (FOSAMAX) 35 MG tablet; Take 1 tablet by mouth Every 7 (Seven) Days for 336 days.  Dispense: 12 tablet; Refill: 3  -     alendronate (FOSAMAX) 35 MG tablet; Take 1 tablet by mouth Every 7 (Seven) Days for 336 days.  Dispense: 12 tablet; Refill: 3    5. Vaginal dryness, menopausal      Assessment & Plan  1. Vitamin B12 deficiency.  The patient's laboratory results from 02/2023 were satisfactory, with the exception of a low B12 level. A B12 injection will be administered today, along with a daily intake of vitamin D.    2. Knee pain.  Tramadol has been prescribed for pain management.    3. Osteoporosis.  A prescription for alendronate has been renewed.    4. Essential tremor.  Laboratory tests will be conducted to rule out any deficiencies in minerals and vitamins.       I spent 35 minutes caring for  Suzette on this date of service. This time includes time spent by me in the following activities:reviewing tests  Follow Up     Return in about 3 months (around 9/18/2024).  Patient was given instructions and counseling regarding her condition or for health maintenance advice. Please see specific information pulled into the AVS if appropriate.   Patient or patient representative verbalized consent for the use of Ambient Listening during the visit with  Isis Ordonez MD for chart documentation. 7/7/2024  10:42 EDT    Isis Ordonez MD

## 2024-06-20 ENCOUNTER — TELEPHONE (OUTPATIENT)
Dept: FAMILY MEDICINE CLINIC | Facility: CLINIC | Age: 74
End: 2024-06-20
Payer: MEDICARE

## 2024-06-20 NOTE — TELEPHONE ENCOUNTER
Caller: Len Wilson    Relationship to patient: Emergency Contact    Best call back number: [phone]     Patient is needing: CALLER STATED THAT THE PHARMACY INFORMED THEY DO NOT HAVE THE VITAMIN B12 IN STRENGTH PRESCRIBED, AND REQUESTING THIS BE FILLED FOR VITAMIN B12 1000 MG SINCE THEY DO HAVE THAT IN STOCK.  [facility] - [address] [phone] [facility] [phone]

## 2024-06-25 RX ORDER — LANOLIN ALCOHOL/MO/W.PET/CERES
2000 CREAM (GRAM) TOPICAL DAILY
Qty: 180 TABLET | Refills: 3 | Status: SHIPPED | OUTPATIENT
Start: 2024-06-25 | End: 2024-09-23

## 2024-08-26 DIAGNOSIS — K21.00 GASTROESOPHAGEAL REFLUX DISEASE WITH ESOPHAGITIS WITHOUT HEMORRHAGE: ICD-10-CM

## 2024-08-26 DIAGNOSIS — I10 ESSENTIAL HYPERTENSION: ICD-10-CM

## 2024-08-26 DIAGNOSIS — M85.80 OSTEOPENIA AFTER MENOPAUSE: ICD-10-CM

## 2024-08-26 DIAGNOSIS — Z78.0 OSTEOPENIA AFTER MENOPAUSE: ICD-10-CM

## 2024-08-26 RX ORDER — RABEPRAZOLE SODIUM 20 MG/1
20 TABLET, DELAYED RELEASE ORAL DAILY
Qty: 90 TABLET | Refills: 1 | Status: SHIPPED | OUTPATIENT
Start: 2024-08-26

## 2024-08-26 RX ORDER — LISINOPRIL 20 MG/1
20 TABLET ORAL DAILY
Qty: 90 TABLET | Refills: 3 | Status: SHIPPED | OUTPATIENT
Start: 2024-08-26 | End: 2025-08-21

## 2024-08-26 RX ORDER — LISINOPRIL 20 MG/1
20 TABLET ORAL DAILY
Qty: 90 TABLET | Refills: 3 | Status: SHIPPED | OUTPATIENT
Start: 2024-08-26 | End: 2024-08-26 | Stop reason: SDUPTHER

## 2024-08-26 RX ORDER — ALENDRONATE SODIUM 35 MG/1
35 TABLET ORAL
Qty: 12 TABLET | Refills: 3 | Status: SHIPPED | OUTPATIENT
Start: 2024-08-26 | End: 2025-07-28

## 2024-08-26 NOTE — TELEPHONE ENCOUNTER
Caller: Suzette Wilson    Relationship: Self    Best call back number: [phone]     Requested Prescriptions:   Requested Prescriptions     Pending Prescriptions Disp Refills    RABEprazole (ACIPHEX) 20 MG EC tablet 90 tablet 1     Sig: Take 1 tablet by mouth Daily.    lisinopril (PRINIVIL,ZESTRIL) 20 MG tablet 90 tablet 3     Sig: Take 1 tablet by mouth Daily for 360 days.    alendronate (FOSAMAX) 35 MG tablet 12 tablet 3     Sig: Take 1 tablet by mouth Every 7 (Seven) Days for 336 days.        Pharmacy where request should be sent: [facility] [phone] [place], [place] [phone]      Last office visit with prescribing clinician: 6/18/2024   Last telemedicine visit with prescribing clinician: Visit date not found   Next office visit with prescribing clinician: 9/20/2024     Additional details provided by patient: Week, to two weeks left on hand.     Does the patient have less than a 3 day supply:  [] Yes  [x] No        Khanh Mcclure Rep   08/26/24 09:57 EDT

## 2024-09-20 ENCOUNTER — OFFICE VISIT (OUTPATIENT)
Dept: FAMILY MEDICINE CLINIC | Facility: CLINIC | Age: 74
End: 2024-09-20
Payer: MEDICARE

## 2024-09-20 VITALS
WEIGHT: 175.5 LBS | BODY MASS INDEX: 31.1 KG/M2 | OXYGEN SATURATION: 97 % | HEIGHT: 63 IN | TEMPERATURE: 97.7 F | SYSTOLIC BLOOD PRESSURE: 120 MMHG | DIASTOLIC BLOOD PRESSURE: 81 MMHG | HEART RATE: 74 BPM

## 2024-09-20 DIAGNOSIS — M25.562 POSTERIOR LEFT KNEE PAIN: ICD-10-CM

## 2024-09-20 DIAGNOSIS — R79.89 ABNORMAL CBC: ICD-10-CM

## 2024-09-20 DIAGNOSIS — I10 ESSENTIAL HYPERTENSION: ICD-10-CM

## 2024-09-20 DIAGNOSIS — M62.838 MUSCLE SPASM OF BOTH LOWER LEGS: ICD-10-CM

## 2024-09-20 DIAGNOSIS — R79.89 LOW VITAMIN B12 LEVEL: ICD-10-CM

## 2024-09-20 DIAGNOSIS — G89.29 CHRONIC PAIN OF LEFT KNEE: Primary | ICD-10-CM

## 2024-09-20 DIAGNOSIS — M25.562 CHRONIC PAIN OF LEFT KNEE: Primary | ICD-10-CM

## 2024-09-20 PROCEDURE — 83735 ASSAY OF MAGNESIUM: CPT | Performed by: FAMILY MEDICINE

## 2024-09-20 PROCEDURE — 84466 ASSAY OF TRANSFERRIN: CPT | Performed by: FAMILY MEDICINE

## 2024-09-20 PROCEDURE — 82607 VITAMIN B-12: CPT | Performed by: FAMILY MEDICINE

## 2024-09-20 PROCEDURE — 83540 ASSAY OF IRON: CPT | Performed by: FAMILY MEDICINE

## 2024-09-20 PROCEDURE — 80053 COMPREHEN METABOLIC PANEL: CPT | Performed by: FAMILY MEDICINE

## 2024-09-20 PROCEDURE — 82746 ASSAY OF FOLIC ACID SERUM: CPT | Performed by: FAMILY MEDICINE

## 2024-09-20 RX ORDER — IBUPROFEN 800 MG/1
TABLET, FILM COATED ORAL
COMMUNITY
Start: 2024-07-09

## 2024-09-20 NOTE — PROGRESS NOTES
Chief Complaint  burning (Burning sensation behind knee going down leg when turned the wrong way )    Elizabteh Wilson presents to [facility]  History of Present Illness  The patient presents for evaluation of left knee pain.    She reports experiencing a burning sensation behind her left knee that extends down her leg, particularly when she moves it sideways. This discomfort began yesterday and has persisted, causing soreness. The pain was so severe that she had to manually lift her leg due to the intense discomfort. The pain is localized to the back of the knee and is exacerbated by certain movements. She recalls a similar incident with her right knee in the past. The frequency of these episodes is approximately once or twice a week, but not daily. She reports no recent falls or injuries to the knee.    She also experiences spasms in her legs and has been advised by a friend to increase her water intake. She admits to forgetting to take her prescribed magnesium supplement.        Past Medical History:   Diagnosis Date    Acid reflux     Anxiety     Depression     Hemorrhoids     Hypertension     Medial meniscus tear 08/11/2016    right knee    Osteoarthritis of left knee 08/24/2016      Family History   Problem Relation Age of Onset    Stroke Mother     Stroke Father     Mental illness Father         Alzheimers    Alzheimer's disease Sister     Alzheimer's disease Brother       Social History     Socioeconomic History    Marital status:    Tobacco Use    Smoking status: Never     Passive exposure: Never    Smokeless tobacco: Never   Vaping Use    Vaping status: Never Used   Substance and Sexual Activity    Alcohol use: Not Currently    Drug use: Never    Sexual activity: Yes     Partners: Male     Comment:  only      Objective   Vital Signs:  /81 (BP Location: Left arm, Patient Position: Sitting, Cuff Size: Adult)   Pulse 74   Temp 97.7 °F  "(36.5 °C) (Oral)   Ht 160 cm (63\")   Wt 79.6 kg (175 lb 8 oz)   SpO2 97%   BMI 31.09 kg/m²   Estimated body mass index is 31.09 kg/m² as calculated from the following:    Height as of this encounter: 160 cm (63\").    Weight as of this encounter: 79.6 kg (175 lb 8 oz).         Review of Systems   Constitutional:  Negative for activity change, chills, fatigue and fever.   HENT:  Negative for congestion, sinus pressure, sinus pain and sore throat.    Eyes:  Negative for discharge and redness.   Respiratory:  Negative for cough and chest tightness.    Cardiovascular:  Negative for chest pain, palpitations and leg swelling.   Gastrointestinal:  Negative for abdominal pain and diarrhea.   Endocrine: Negative for cold intolerance and heat intolerance.   Genitourinary:  Negative for difficulty urinating and dysuria.   Musculoskeletal:  Negative for gait problem and neck stiffness.   Skin:  Negative for pallor and rash.   Neurological:  Negative for dizziness and headaches.   Psychiatric/Behavioral:  Negative for agitation, behavioral problems and confusion.         Physical Exam  Heart sounds are normal.  Physical Exam  Vitals reviewed.   Constitutional:       Appearance: Normal appearance.   HENT:      Right Ear: Tympanic membrane normal.      Left Ear: Tympanic membrane normal.      Nose: Nose normal.   Eyes:      Extraocular Movements: Extraocular movements intact.      Conjunctiva/sclera: Conjunctivae normal.      Pupils: Pupils are equal, round, and reactive to light.   Cardiovascular:      Rate and Rhythm: Normal rate and regular rhythm.   Pulmonary:      Effort: Pulmonary effort is normal.      Breath sounds: Normal breath sounds.   Abdominal:      General: Bowel sounds are normal.   Musculoskeletal:         General: Normal range of motion.      Cervical back: Normal range of motion.   Skin:     General: Skin is warm and dry.   Neurological:      General: No focal deficit present.      Mental Status: She is alert " and oriented to person, place, and time.   Psychiatric:         Mood and Affect: Mood normal.         Behavior: Behavior normal.          Result Review       Results  Laboratory Studies  B12 levels were low.             Assessment and Plan     Diagnoses and all orders for this visit:    1. Chronic pain of left knee (Primary)  -     XR Knee 3 View Left; Future    2. Posterior left knee pain    3. Low vitamin B12 level  -     Vitamin B12 & Folate    4. Essential hypertension  -     Comprehensive Metabolic Panel    5. Muscle spasm of both lower legs  -     Magnesium    6. Abnormal CBC  -     Iron Profile      Assessment & Plan  1. Left knee pain.  She reports a burning sensation behind her left knee extending down her leg, which worsens with certain movements. There is a history of a meniscus tear in the right knee. The pain could be due to arthritis or a popliteal cyst. An x-ray of the left knee will be ordered to assess the bones and check for a popliteal cyst. If a cyst is identified, a referral to orthopedics will be made for potential drainage. If arthritis is confirmed, a steroid injection may be considered for pain relief.    2. Muscle spasms.  She experiences muscle spasms in her legs. This could be related to low magnesium or iron levels. Labs will be conducted to check her magnesium and iron levels. She is advised to ensure she takes her magnesium supplements regularly.    3. Health Maintenance.  She is due for routine labs to monitor kidney function and B12 levels, as these are checked every 6 months. Labs will be drawn today to ensure her kidney function and B12 levels are within normal limits.    Follow-up  Return in 3 months for follow-up.       I spent 35 minutes caring for Suzette on this date of service. This time includes time spent by me in the following activities:reviewing tests  Follow Up  Return in about 3 months (around 12/20/2024).  Patient was given instructions and counseling regarding her  condition or for health maintenance advice. Please see specific information pulled into the AVS if appropriate.   Patient or patient representative verbalized consent for the use of Ambient Listening during the visit with  Isis Ordonez MD for chart documentation. 10/6/2024  14:50 EDT    Isis Ordonez MD

## 2024-09-21 LAB
ALBUMIN SERPL-MCNC: 4.3 G/DL (ref 3.5–5.2)
ALBUMIN/GLOB SERPL: 1.6 G/DL
ALP SERPL-CCNC: 52 U/L (ref 39–117)
ALT SERPL W P-5'-P-CCNC: 10 U/L (ref 1–33)
ANION GAP SERPL CALCULATED.3IONS-SCNC: 9.9 MMOL/L (ref 5–15)
AST SERPL-CCNC: 18 U/L (ref 1–32)
BILIRUB SERPL-MCNC: 0.5 MG/DL (ref 0–1.2)
BUN SERPL-MCNC: 16 MG/DL (ref 8–23)
BUN/CREAT SERPL: 17.2 (ref 7–25)
CALCIUM SPEC-SCNC: 9.3 MG/DL (ref 8.6–10.5)
CHLORIDE SERPL-SCNC: 100 MMOL/L (ref 98–107)
CO2 SERPL-SCNC: 26.1 MMOL/L (ref 22–29)
CREAT SERPL-MCNC: 0.93 MG/DL (ref 0.57–1)
EGFRCR SERPLBLD CKD-EPI 2021: 64.6 ML/MIN/1.73
FOLATE SERPL-MCNC: 13 NG/ML (ref 4.78–24.2)
GLOBULIN UR ELPH-MCNC: 2.7 GM/DL
GLUCOSE SERPL-MCNC: 87 MG/DL (ref 65–99)
IRON 24H UR-MRATE: 114 MCG/DL (ref 37–145)
IRON SATN MFR SERPL: 31 % (ref 20–50)
MAGNESIUM SERPL-MCNC: 2 MG/DL (ref 1.6–2.4)
POTASSIUM SERPL-SCNC: 4.5 MMOL/L (ref 3.5–5.2)
PROT SERPL-MCNC: 7 G/DL (ref 6–8.5)
SODIUM SERPL-SCNC: 136 MMOL/L (ref 136–145)
TIBC SERPL-MCNC: 367 MCG/DL (ref 298–536)
TRANSFERRIN SERPL-MCNC: 246 MG/DL (ref 200–360)
VIT B12 BLD-MCNC: >2000 PG/ML (ref 211–946)

## 2024-11-11 ENCOUNTER — OFFICE VISIT (OUTPATIENT)
Dept: FAMILY MEDICINE CLINIC | Facility: CLINIC | Age: 74
End: 2024-11-11
Payer: MEDICARE

## 2024-11-11 VITALS
WEIGHT: 180 LBS | HEART RATE: 60 BPM | DIASTOLIC BLOOD PRESSURE: 78 MMHG | TEMPERATURE: 97.8 F | SYSTOLIC BLOOD PRESSURE: 124 MMHG | BODY MASS INDEX: 31.89 KG/M2 | HEIGHT: 63 IN | OXYGEN SATURATION: 99 %

## 2024-11-11 DIAGNOSIS — M41.20 OTHER IDIOPATHIC SCOLIOSIS, UNSPECIFIED SPINAL REGION: ICD-10-CM

## 2024-11-11 DIAGNOSIS — S00.83XD CONTUSION OF FACE, SUBSEQUENT ENCOUNTER: ICD-10-CM
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
## 2024-11-11 DIAGNOSIS — M85.80 OSTEOPENIA AFTER MENOPAUSE: ICD-10-CM

## 2024-11-11 DIAGNOSIS — I10 PRIMARY HYPERTENSION: Primary | ICD-10-CM

## 2024-11-11 DIAGNOSIS — Z78.0 OSTEOPENIA AFTER MENOPAUSE: ICD-10-CM

## 2024-11-11 DIAGNOSIS — Z00.00 ENCOUNTER FOR SUBSEQUENT ANNUAL WELLNESS VISIT (AWV) IN MEDICARE PATIENT: ICD-10-CM

## 2024-11-11 PROCEDURE — 3074F SYST BP LT 130 MM HG: CPT | Performed by: FAMILY MEDICINE

## 2024-11-11 PROCEDURE — 1125F AMNT PAIN NOTED PAIN PRSNT: CPT | Performed by: FAMILY MEDICINE

## 2024-11-11 PROCEDURE — G0439 PPPS, SUBSEQ VISIT: HCPCS | Performed by: FAMILY MEDICINE

## 2024-11-11 PROCEDURE — 3078F DIAST BP <80 MM HG: CPT | Performed by: FAMILY MEDICINE

## 2024-11-11 PROCEDURE — 99214 OFFICE O/P EST MOD 30 MIN: CPT | Performed by: FAMILY MEDICINE

## 2024-11-11 NOTE — PROGRESS NOTES
Subjective   The ABCs of the Annual Wellness Visit  Medicare Wellness Visit      Suzette Wilson is a 74 y.o. patient who presents for a Medicare Wellness Visit.    The following portions of the patient's history were reviewed and   updated as appropriate: allergies, current medications, past family history, past medical history, past social history, past surgical history, and problem list.    Compared to one year ago, the patient's physical   health is the same.  Compared to one year ago, the patient's mental   health is the same.    Recent Hospitalizations:  She was not admitted to the hospital during the last year.     Current Medical Providers:  Patient Care Team:  Isis Ordonez MD as PCP - General (Family Medicine)    Outpatient Medications Prior to Visit   Medication Sig Dispense Refill    acetaminophen (TYLENOL) 325 MG tablet       alendronate (FOSAMAX) 35 MG tablet Take 1 tablet by mouth Every 7 (Seven) Days for 336 days. 12 tablet 3    ibuprofen (ADVIL,MOTRIN) 800 MG tablet       lidocaine (LIDODERM) 5 % Place 2 patches on the skin as directed by provider Daily for 15 days. Remove & Discard patch within 12 hours or as directed by MD 30 patch 0    lisinopril (PRINIVIL,ZESTRIL) 20 MG tablet Take 1 tablet by mouth Daily for 360 days. 90 tablet 3    Magnesium 250 MG tablet Take 1 tablet by mouth Daily. 90 each 0    meloxicam (MOBIC) 15 MG tablet Take 1 tablet by mouth Daily. 90 tablet 1    metoprolol succinate XL (TOPROL-XL) 50 MG 24 hr tablet Take 1 tablet by mouth Daily for 360 days. 90 tablet 3    naloxone (NARCAN) 4 MG/0.1ML nasal spray       predniSONE (DELTASONE) 20 MG tablet Take 1 tablet by mouth Daily for 5 days. 5 tablet 0    RABEprazole (ACIPHEX) 20 MG EC tablet Take 1 tablet by mouth Daily. 90 tablet 1    traMADol (ULTRAM) 50 MG tablet Take 1 tablet by mouth Every 12 (Twelve) Hours As Needed for Moderate Pain for up to 180 days. 180 tablet 1    vitamin B-12 (CYANOCOBALAMIN) 1000 MCG tablet     "   buPROPion XL (Wellbutrin XL) 150 MG 24 hr tablet Take 1 tablet by mouth Daily for 180 days. 90 tablet 1    estrogen, conjugated,-medroxyprogesterone (Prempro) 0.3-1.5 MG per tablet Take 1 tablet by mouth Daily for 180 days. 90 tablet 1     Facility-Administered Medications Prior to Visit   Medication Dose Route Frequency Provider Last Rate Last Admin    cyanocobalamin injection 1,000 mcg  1,000 mcg Intramuscular Q28 Days Isis Ordonez MD   1,000 mcg at 06/18/24 1104     Opioid medication/s are on active medication list.  and I have evaluated her active treatment plan and pain score trends (see table).  Vitals:    11/11/24 1419   PainSc:   3     I have reviewed the chart for potential of high risk medication and harmful drug interactions in the elderly.        Aspirin is not on active medication list.  Aspirin use is contraindicated for this patient due to: current NSAID therapy.  .    Patient Active Problem List   Diagnosis    Esophageal reflux    Hypertension    Urge incontinence    Memory problem    Anemia    Sciatic nerve disease, right    Mood swings    Muscle spasms of both lower extremities     Advance Care Planning Advance Directive is not on file.  ACP discussion was held with the patient during this visit. Patient does not have an advance directive, information provided.            Objective   Vitals:    11/11/24 1419   BP: 124/78   Pulse: 60   Temp: 97.8 °F (36.6 °C)   SpO2: 99%   Weight: 81.6 kg (180 lb)   Height: 160 cm (63\")   PainSc:   3       Estimated body mass index is 31.89 kg/m² as calculated from the following:    Height as of this encounter: 160 cm (63\").    Weight as of this encounter: 81.6 kg (180 lb).    BMI is >= 30 and <35. (Class 1 Obesity). The following options were offered after discussion;: nutrition counseling/recommendations       Does the patient have evidence of cognitive impairment? No                                                                                           "      Health  Risk Assessment    Smoking Status:  Social History     Tobacco Use   Smoking Status Never    Passive exposure: Never   Smokeless Tobacco Never     Alcohol Consumption:  Social History     Substance and Sexual Activity   Alcohol Use Not Currently       Fall Risk Screen  STEADI Fall Risk Assessment was completed, and patient is at MODERATE risk for falls. Assessment completed on:2024    Depression Screenin/11/2024     2:24 PM   PHQ-2/PHQ-9 Depression Screening   Little interest or pleasure in doing things Not at all   Feeling down, depressed, or hopeless Not at all   How difficult have these problems made it for you to do your work, take care of things at home, or get along with other people? Not difficult at all     Health Habits and Functional and Cognitive Screenin/11/2024     2:21 PM   Functional & Cognitive Status   Do you have difficulty preparing food and eating? No   Do you have difficulty bathing yourself, getting dressed or grooming yourself? No   Do you have difficulty using the toilet? No   Do you have difficulty moving around from place to place? No   Do you have trouble with steps or getting out of a bed or a chair? No   Current Diet Well Balanced Diet   Dental Exam Up to date   Eye Exam Up to date   Exercise (times per week) 7 times per week   Current Exercises Include Walking   Do you need help using the phone?  No   Are you deaf or do you have serious difficulty hearing?  No   Do you need help to go to places out of walking distance? No   Do you need help shopping? No   Do you need help preparing meals?  No   Do you need help with housework?  No   Do you need help with laundry? No   Do you need help taking your medications? No   Do you need help managing money? No   Do you ever drive or ride in a car without wearing a seat belt? No   Have you felt unusual stress, anger or loneliness in the last month? No   Who do you live with? Spouse   If you need help, do you  have trouble finding someone available to you? No   Have you been bothered in the last four weeks by sexual problems? No   Do you have difficulty concentrating, remembering or making decisions? No           Age-appropriate Screening Schedule:  Refer to the list below for future screening recommendations based on patient's age, sex and/or medical conditions. Orders for these recommended tests are listed in the plan section. The patient has been provided with a written plan.    Health Maintenance List  Health Maintenance   Topic Date Due    ZOSTER VACCINE (1 of 2) Never done    Pneumococcal Vaccine 65+ (1 of 1 - PCV) Never done    HEPATITIS C SCREENING  Never done    COVID-19 Vaccine (1 - 2024-25 season) Never done    RSV Vaccine - Adults (1 - 1-dose 75+ series) Never done    BMI FOLLOWUP  10/09/2024    DXA SCAN  02/03/2025    INFLUENZA VACCINE  03/31/2025 (Originally 7/1/2024)    TDAP/TD VACCINES (1 - Tdap) 11/11/2025 (Originally 10/8/1969)    ANNUAL WELLNESS VISIT  11/11/2025    COLORECTAL CANCER SCREENING  04/24/2027    MAMMOGRAM  Discontinued                                                                                                                                                CMS Preventative Services Quick Reference  Risk Factors Identified During Encounter  Fall Risk-High or Moderate: Discussed Fall Prevention in the home    The above risks/problems have been discussed with the patient.  Pertinent information has been shared with the patient in the After Visit Summary.  An After Visit Summary and PPPS were made available to the patient.    Follow Up:  Next Medicare Wellness visit to be scheduled in 1 year.          Additional E&M Note during same encounter follows:  Patient has multiple medical problems which are significant and separately identifiable that require additional work above and beyond the Medicare Wellness Visit.      Chief Complaint  AMV and fell 2 weeks ago    Suzette Wilson is a 74 y.o.  "female who presents to [facility]     History of Present Illness  The patient presents for evaluation following a fall.    She experienced a fall in her kitchen, tripping over a rubber rug and landing on her side. This resulted in a black eye and soreness on her side, but no fractures. She sought medical attention at [facility] last week due to the persistent soreness. Her  applied an Ace bandage for support. Despite the discomfort, she is able to lie on the affected side and lift her arm without pain. She has been using patches for relief.    She reports no new headaches, although she has a history of frequent headaches. She feels physically well overall and attributes the fall to clumsiness.    She has a medical power of  and a living will in place. She has requested refills for her medications, including Prempro and Wellbutrin.    She has a history of osteopenia and scoliosis but reports no back pain. She makes an effort to maintain good posture while walking.    FAMILY HISTORY  Her daughter has scoliosis.    Objective   Vital Signs:   Vitals:    11/11/24 1419   BP: 124/78   Pulse: 60   Temp: 97.8 °F (36.6 °C)   SpO2: 99%   Weight: 81.6 kg (180 lb)   Height: 160 cm (63\")   PainSc:   3       Wt Readings from Last 3 Encounters:   11/11/24 81.6 kg (180 lb)   11/07/24 81.6 kg (179 lb 14.4 oz)   09/20/24 79.6 kg (175 lb 8 oz)     BP Readings from Last 3 Encounters:   11/11/24 124/78   11/07/24 176/82   09/20/24 120/81       Physical Exam  Constitutional:       Appearance: Normal appearance.   HENT:      Right Ear: Tympanic membrane normal.      Left Ear: Tympanic membrane normal.      Nose: Nose normal.   Eyes:      Extraocular Movements: Extraocular movements intact.      Conjunctiva/sclera: Conjunctivae normal.      Pupils: Pupils are equal, round, and reactive to light.   Cardiovascular:      Rate and Rhythm: Normal rate and regular rhythm.   Pulmonary:      Effort: " Pulmonary effort is normal.      Breath sounds: Normal breath sounds.   Abdominal:      General: Bowel sounds are normal.   Musculoskeletal:         General: Normal range of motion.      Cervical back: Normal range of motion.   Skin:     General: Skin is warm and dry.   Neurological:      General: No focal deficit present.      Mental Status: She is alert and oriented to person, place, and time.   Psychiatric:         Mood and Affect: Mood normal.         Behavior: Behavior normal.         Physical Exam  Lungs are normal.  Heart is normal.    The following data was reviewed by Isis Ordonez MD on 11/11/2024  Common Labs   Common labs          2/13/2024    14:38 9/20/2024    09:52   Common Labs   Glucose 84  87    BUN 15  16    Creatinine 0.91  0.93    Sodium 138  136    Potassium 4.2  4.5    Chloride 104  100    Calcium 8.9  9.3    Albumin 4.6  4.3    Total Bilirubin 0.4  0.5    Alkaline Phosphatase 57  52    AST (SGOT) 21  18    ALT (SGPT) 12  10    WBC 6.44     Hemoglobin 12.7     Hematocrit 38.4     Platelets 226         Results  Laboratory Studies  Blood sugar, kidney function, liver function, B12, magnesium, and iron levels were all normal.        Assessment & Plan   Diagnoses and all orders for this visit:    1. Primary hypertension (Primary)    2. Fall, subsequent encounter    3. Encounter for subsequent annual wellness visit (AWV) in Medicare patient    4. Contusion of face, subsequent encounter    5. Osteopenia after menopause  -     Dexa Bone Density, Axial (Every 2 Years); Future    6. Other idiopathic scoliosis, unspecified spinal region        Assessment & Plan  1. Fall.  She experienced a fall in the kitchen, resulting in a black eye and soreness on her side. She tripped over a rubber rug and her own foot. There were no fractures or broken ribs confirmed by a recent visit to urgent care. She is currently using patches for soreness. Her blood pressure is within normal range today. Recommendations  were made to ensure a safe home environment to prevent future falls, including securing loose rugs and avoiding obstacles.     2. Osteopenia.  A bone x-ray is scheduled for early 2025, with the last one conducted in February 2023. She was advised to maintain strong bones to decrease the risk of fractures.    3. Mild Scoliosis.  She has a mild curvature of the spine, which could contribute to chronic back pain. She was advised to maintain good posture and keep her spine strong to reduce the risk of fractures.    4. Medication Management.  Refills for her Prempro and Wellbutrin prescriptions were provided. She is currently on meloxicam for hip pain.         BMI is >= 30 and <35. (Class 1 Obesity). The following options were offered after discussion;: nutrition counseling/recommendations       FOLLOW UP  Return in about 3 months (around 2/11/2025).  Patient was given instructions and counseling regarding her condition or for health maintenance advice. Please see specific information pulled into the AVS if appropriate.     Patient or patient representative verbalized consent for the use of Ambient Listening during the visit with  Isis Ordonez MD for chart documentation. 12/3/2024  14:54 LAMONTE Ordonez MD  12/03/24  08:47 LAMONTE

## 2024-11-14 DIAGNOSIS — R45.86 MOOD SWINGS: ICD-10-CM

## 2024-11-14 DIAGNOSIS — F33.8 SEASONAL AFFECTIVE DISORDER: ICD-10-CM

## 2024-11-14 RX ORDER — BUPROPION HYDROCHLORIDE 150 MG/1
150 TABLET ORAL DAILY
Qty: 90 TABLET | Refills: 1 | Status: SHIPPED | OUTPATIENT
Start: 2024-11-14 | End: 2025-05-13

## 2024-11-14 NOTE — TELEPHONE ENCOUNTER
Caller: Suzette Wilson    Relationship: Self    Best call back number:  [phone]    Requested Prescriptions:   Requested Prescriptions     Pending Prescriptions Disp Refills    buPROPion XL (Wellbutrin XL) 150 MG 24 hr tablet 90 tablet 1     Sig: Take 1 tablet by mouth Daily for 180 days.    PLEASE ADD REFILLS TO PRESCRIPTION     Pharmacy where request should be sent: [place] [phone] [place] [phone]      Last office visit with prescribing clinician: 11/11/2024   Last telemedicine visit with prescribing clinician: Visit date not found   Next office visit with prescribing clinician: Visit date not found     Additional details provided by patient:     Does the patient have less than a 3 day supply:  [] Yes  [x] No    Would you like a call back once the refill request has been completed: [x] Yes [] No    If the office needs to give you a call back, can they leave a voicemail: [] Yes [] No    Khanh Meneses Rep   11/14/24 09:11 EST

## 2024-12-12 DIAGNOSIS — N95.1 VAGINAL DRYNESS, MENOPAUSAL: ICD-10-CM

## 2024-12-12 RX ORDER — ESTROGEN,CON/M-PROGEST ACET 0.3-1.5MG
1 TABLET ORAL DAILY
Qty: 90 TABLET | Refills: 1 | Status: SHIPPED | OUTPATIENT
Start: 2024-12-12 | End: 2024-12-12 | Stop reason: SDUPTHER

## 2024-12-12 RX ORDER — ESTROGEN,CON/M-PROGEST ACET 0.3-1.5MG
1 TABLET ORAL DAILY
Qty: 90 TABLET | Refills: 1 | Status: SHIPPED | OUTPATIENT
Start: 2024-12-12 | End: 2025-06-10

## 2024-12-12 NOTE — TELEPHONE ENCOUNTER
Caller: Suzette Wilson    Relationship: Self    Best call back number: [phone]     Requested Prescriptions:   Requested Prescriptions     Pending Prescriptions Disp Refills    estrogen, conjugated,-medroxyprogesterone (Prempro) 0.3-1.5 MG per tablet 90 tablet 1     Sig: Take 1 tablet by mouth Daily for 180 days.        Pharmacy where request should be sent: [place] [phone] [facility] [phone]      Last office visit with prescribing clinician: 11/11/2024   Last telemedicine visit with prescribing clinician: Visit date not found   Next office visit with prescribing clinician: Visit date not found     Additional details provided by patient: 3 DAYS OF DOSES LEFT      Khanh Mendez Rep   12/12/24 09:45 EST

## 2025-01-08 DIAGNOSIS — K21.00 GASTROESOPHAGEAL REFLUX DISEASE WITH ESOPHAGITIS WITHOUT HEMORRHAGE: ICD-10-CM

## 2025-01-09 RX ORDER — RABEPRAZOLE SODIUM 20 MG/1
20 TABLET, DELAYED RELEASE ORAL DAILY
Qty: 90 TABLET | Refills: 1 | Status: SHIPPED | OUTPATIENT
Start: 2025-01-09

## 2025-01-10 DIAGNOSIS — G57.01 SCIATIC NERVE DISEASE, RIGHT: ICD-10-CM

## 2025-01-10 RX ORDER — TRAMADOL HYDROCHLORIDE 50 MG/1
50 TABLET ORAL EVERY 12 HOURS PRN
Qty: 180 TABLET | Refills: 1 | Status: SHIPPED | OUTPATIENT
Start: 2025-01-10 | End: 2025-07-09

## 2025-02-10 DIAGNOSIS — R45.86 MOOD SWINGS: ICD-10-CM

## 2025-02-10 DIAGNOSIS — F33.8 SEASONAL AFFECTIVE DISORDER: ICD-10-CM

## 2025-02-10 RX ORDER — BUPROPION HYDROCHLORIDE 150 MG/1
150 TABLET ORAL DAILY
Qty: 90 TABLET | Refills: 1 | Status: SHIPPED | OUTPATIENT
Start: 2025-02-10 | End: 2025-02-10 | Stop reason: SDUPTHER

## 2025-02-10 RX ORDER — BUPROPION HYDROCHLORIDE 150 MG/1
150 TABLET ORAL DAILY
Qty: 90 TABLET | Refills: 0 | Status: SHIPPED | OUTPATIENT
Start: 2025-02-10 | End: 2025-02-11 | Stop reason: SDUPTHER

## 2025-02-10 NOTE — TELEPHONE ENCOUNTER
Caller: Suzette Wilson    Relationship: Self    Best call back number: [phone]    Requested Prescriptions:   Requested Prescriptions     Pending Prescriptions Disp Refills    buPROPion XL (Wellbutrin XL) 150 MG 24 hr tablet 90 tablet 1     Sig: Take 1 tablet by mouth Daily for 180 days.        Pharmacy where request should be sent: [place] [phone] [place] [phone]      Last office visit with prescribing clinician: 11/11/2024   Last telemedicine visit with prescribing clinician: Visit date not found   Next office visit with prescribing clinician: 2/11/2025     Additional details provided by patient: PATIENT STATES SHE IS OUT OF THIS MEDICATION COMPLETELY AND TOOK HER LAST DOSE ON 2.8.25.   PATIENT STATES SHE WOULD LIKE A CALL WHEN MEDICATION HAS BEEN SENT IN. PATIENT STATES IT IS OKAY TO LEAVE A MESSAGE IF NO ANSWER.     Does the patient have less than a 3 day supply:  [x] Yes  [] No      Khanh Antonio Rep   02/10/25 12:33 EST

## 2025-02-11 ENCOUNTER — OFFICE VISIT (OUTPATIENT)
Dept: FAMILY MEDICINE CLINIC | Facility: CLINIC | Age: 75
End: 2025-02-11
Payer: MEDICARE

## 2025-02-11 VITALS
SYSTOLIC BLOOD PRESSURE: 128 MMHG | BODY MASS INDEX: 32.25 KG/M2 | OXYGEN SATURATION: 98 % | DIASTOLIC BLOOD PRESSURE: 72 MMHG | TEMPERATURE: 97.8 F | HEART RATE: 66 BPM | WEIGHT: 182 LBS | HEIGHT: 63 IN

## 2025-02-11 DIAGNOSIS — F01.A0 MILD VASCULAR DEMENTIA WITHOUT BEHAVIORAL DISTURBANCE, PSYCHOTIC DISTURBANCE, MOOD DISTURBANCE, OR ANXIETY: Primary | ICD-10-CM

## 2025-02-11 DIAGNOSIS — I10 ESSENTIAL HYPERTENSION: ICD-10-CM

## 2025-02-11 DIAGNOSIS — I10 PRIMARY HYPERTENSION: ICD-10-CM

## 2025-02-11 DIAGNOSIS — Z78.0 OSTEOPENIA AFTER MENOPAUSE: ICD-10-CM

## 2025-02-11 DIAGNOSIS — F33.8 SEASONAL AFFECTIVE DISORDER: ICD-10-CM

## 2025-02-11 DIAGNOSIS — R45.86 MOOD SWINGS: ICD-10-CM

## 2025-02-11 DIAGNOSIS — M85.80 OSTEOPENIA AFTER MENOPAUSE: ICD-10-CM

## 2025-02-11 RX ORDER — BUPROPION HYDROCHLORIDE 150 MG/1
150 TABLET ORAL DAILY
Qty: 90 TABLET | Refills: 3 | Status: SHIPPED | OUTPATIENT
Start: 2025-02-11 | End: 2025-08-10

## 2025-02-11 RX ORDER — DONEPEZIL HYDROCHLORIDE 5 MG/1
5 TABLET, FILM COATED ORAL NIGHTLY
Qty: 90 TABLET | Refills: 1 | Status: SHIPPED | OUTPATIENT
Start: 2025-02-11 | End: 2025-05-12

## 2025-02-11 RX ORDER — METOPROLOL SUCCINATE 50 MG/1
50 TABLET, EXTENDED RELEASE ORAL DAILY
Qty: 90 TABLET | Refills: 3 | Status: SHIPPED | OUTPATIENT
Start: 2025-02-11 | End: 2026-02-06

## 2025-02-11 NOTE — LETTER
February 26, 2025    Suzette Wilson  [address]  [place] [address]      Dear Ms. Wilson      As your healthcare provider, we are committed to ensuring that you receive timely checkups and tests to keep you in good health.    Our records indicate that you did not have the following ordered tests or procedures completed: Lab Work     Please call the office at your earliest convenience to discuss next steps.  If you did have the ordered testing performed at a location other than [facility],   please call and have that facility send us the results.     Thank you for allowing us to take part in your healthcare. If you have any questions, please feel free to call us.      Sincerely,    [facility]  Isis Ordonez MD

## 2025-02-11 NOTE — PROGRESS NOTES
"Chief Complaint  Med Refill (Prempro, metoprolol and new script for wellbutrin all to go to [facility] pharmacy), Altered Mental Status (\"I am getting  more confused and having issues processing things\"), and Pain medication (\"I was taking one tablet a day but now I am needing to take two tablets\")    Elizabeth Wilson presents to [facility]  History of Present Illness  The patient presents for evaluation of memory loss, pain management, fatigue, and blood pressure management.    She reports experiencing emotional instability, which she attributes to a lapse in her bupropion medication. She expresses satisfaction with the medication's efficacy and has recently resumed it after a brief discontinuation due to pharmacy closure. She is currently on a 30-day supply of bupropion and anticipates a change in her prescription to a 90-day supply.    She has been utilizing tramadol for pain management, initially as a single dose but has recently increased it to twice daily. She reports significant relief from pain, stiffness, and discomfort with this regimen.    She also reports frequent confusion, leading to emotional distress. She is seeking potential interventions to address this issue. She engages in cognitive activities such as puzzles on her phone.    She is scheduled for a bone scan on Tuesday. She is currently taking Prempro and does not experience hot flashes.    She is not currently on any cholesterol-lowering medication. Her morning routine includes coffee without creamer and peanut butter crackers, provided by her , to facilitate medication intake.    She is on metoprolol for blood pressure management.    She reports persistent fatigue despite being on a B12 supplement, which she takes in the morning.    MEDICATIONS  Current: bupropion, tramadol, B12, Fosamax, metoprolol, Prempro        Medical History: has a past medical history of Acid reflux, Anxiety, " "Depression, Hemorrhoids, Hypertension, Medial meniscus tear (08/11/2016), and Osteoarthritis of left knee (08/24/2016).   Surgical History: has a past surgical history that includes Colonoscopy; Cystocele repair; Facelift; Esophagogastroduodenoscopy (2017); Rectal surgery; Tonsillectomy (1968); and Tubal ligation (1973).   Family History: family history includes Alzheimer's disease in her brother and sister; Mental illness in her father; Stroke in her father and mother.   Social History: reports that she has never smoked. She has never been exposed to tobacco smoke. She has never used smokeless tobacco. She reports that she does not currently use alcohol. She reports that she does not use drugs.    There is no immunization history on file for this patient.    Objective   Vital Signs:  /72   Pulse 66   Temp 97.8 °F (36.6 °C)   Ht 160 cm (63\")   Wt 82.6 kg (182 lb)   SpO2 98%   BMI 32.24 kg/m²   Estimated body mass index is 32.24 kg/m² as calculated from the following:    Height as of this encounter: 160 cm (63\").    Weight as of this encounter: 82.6 kg (182 lb).             ROS:  Review of Systems   Constitutional:  Positive for fatigue.   Musculoskeletal:  Positive for myalgias.   Neurological:  Positive for weakness.      Physical Exam  Vitals reviewed.   Constitutional:       Appearance: Normal appearance.   HENT:      Right Ear: Tympanic membrane normal.      Left Ear: Tympanic membrane normal.      Nose: Nose normal.   Eyes:      Extraocular Movements: Extraocular movements intact.      Conjunctiva/sclera: Conjunctivae normal.      Pupils: Pupils are equal, round, and reactive to light.   Cardiovascular:      Rate and Rhythm: Normal rate and regular rhythm.   Pulmonary:      Effort: Pulmonary effort is normal.      Breath sounds: Normal breath sounds.   Abdominal:      General: Bowel sounds are normal.   Musculoskeletal:         General: Normal range of motion.      Cervical back: Normal range of " motion.   Skin:     General: Skin is warm and dry.   Neurological:      General: No focal deficit present.      Mental Status: She is alert and oriented to person, place, and time.   Psychiatric:         Mood and Affect: Mood normal.         Behavior: Behavior normal.       Physical Exam  Lungs are clear.  Heart sounds are steady.      Result Review     The following data was reviewed by: Isis Ordonez MD on 02/11/2025:  Common labs          9/20/2024    09:52   Common Labs   Glucose 87    BUN 16    Creatinine 0.93    Sodium 136    Potassium 4.5    Chloride 100    Calcium 9.3    Albumin 4.3    Total Bilirubin 0.5    Alkaline Phosphatase 52    AST (SGOT) 18    ALT (SGPT) 10      Results  Laboratory Studies  B12 level was greater than 2000.    Imaging  MRI showed small vessel ischemic changes.             Assessment and Plan     Diagnoses and all orders for this visit:    1. Mild vascular dementia without behavioral disturbance, psychotic disturbance, mood disturbance, or anxiety (Primary)  -     Comprehensive Metabolic Panel  -     donepezil (Aricept) 5 MG tablet; Take 1 tablet by mouth Every Night for 90 days.  Dispense: 90 tablet; Refill: 1    2. Mood swings  -     buPROPion XL (Wellbutrin XL) 150 MG 24 hr tablet; Take 1 tablet by mouth Daily for 180 days.  Dispense: 90 tablet; Refill: 3    3. Seasonal affective disorder  -     buPROPion XL (Wellbutrin XL) 150 MG 24 hr tablet; Take 1 tablet by mouth Daily for 180 days.  Dispense: 90 tablet; Refill: 3    4. Primary hypertension  -     Lipid Panel    5. Essential hypertension  -     metoprolol succinate XL (TOPROL-XL) 50 MG 24 hr tablet; Take 1 tablet by mouth Daily for 360 days.  Dispense: 90 tablet; Refill: 3    6. Osteopenia after menopause      Assessment & Plan  1. Emotional instability.  She reported running out of bupropion and experiencing increased emotional sensitivity. A prescription for bupropion will be refilled and sent to her regular  pharmacy.    2. Pain management.  She is currently taking tramadol 50 mg as needed for pain. She was advised that it is safe to take tramadol twice a day if needed, as it does not harm the kidneys or liver.    3. Memory loss.  She has been experiencing memory issues and confusion, which may be due to age-related dementia secondary to ischemic changes in the brain. An MRI conducted a year ago revealed small vessel ischemic changes. Her B12 levels are within the normal range. She was advised to engage in activities that stimulate cognitive function, such as reading and solving puzzles. She was also encouraged to use GPS and sticky notes as reminders. A prescription for Aricept 5 mg once daily at night will be initiated to help preserve memory function.    4. Osteopenia.  She is currently taking Fosamax for osteopenia, which was previously identified in her hips and thigh bones. A bone scan is scheduled for Tuesday to assess the current status of her bone density. If there is improvement, she will continue taking Fosamax weekly.    5. Cholesterol management.  Her last cholesterol check was in 2023. A fasting lab test will be ordered to update her cholesterol levels. If necessary, a low-dose cholesterol medication may be considered to prevent future damage.    6. Blood pressure management.  Her blood pressure is well-controlled. A prescription for metoprolol has been sent to her regular pharmacy to continue managing her blood pressure.    7. Fatigue.  She reports persistent fatigue despite being on a B12 supplement, which she takes in the morning.    Follow-up  The patient is scheduled for a follow-up visit in 1 month to evaluate her response to the new medication.       I spent 35 minutes caring for Suzette on this date of service. This time includes time spent by me in the following activities:reviewing tests  Follow Up   Return in about 3 months (around 5/11/2025).  Patient was given instructions and counseling  regarding her condition or for health maintenance advice. Please see specific information pulled into the AVS if appropriate.   Patient or patient representative verbalized consent for the use of Ambient Listening during the visit with  Isis Ordonez MD for chart documentation. 3/2/2025  10:44 EST    Isis Ordonez MD      Answers submitted by the patient for this visit:  Problem not listed (Submitted on 2/4/2025)  Chief Complaint: Other medical problem  Reason for appointment: routine follow up  joint pain: Yes  Onset: 1 to 5 years  Chronicity: recurrent  Frequency: daily  Medications tried: follow up

## 2025-02-18 ENCOUNTER — HOSPITAL ENCOUNTER (OUTPATIENT)
Dept: BONE DENSITY | Facility: HOSPITAL | Age: 75
Discharge: HOME | End: 2025-02-18
Admitting: FAMILY MEDICINE
Payer: MEDICARE

## 2025-02-18 DIAGNOSIS — Z78.0 OSTEOPENIA AFTER MENOPAUSE: ICD-10-CM

## 2025-02-18 DIAGNOSIS — M85.80 OSTEOPENIA AFTER MENOPAUSE: ICD-10-CM

## 2025-02-18 PROCEDURE — 77080 DXA BONE DENSITY AXIAL: CPT

## 2025-03-07 ENCOUNTER — OFFICE VISIT (OUTPATIENT)
Dept: FAMILY MEDICINE CLINIC | Facility: CLINIC | Age: 75
End: 2025-03-07
Payer: MEDICARE

## 2025-03-07 VITALS
HEIGHT: 63 IN | WEIGHT: 180 LBS | SYSTOLIC BLOOD PRESSURE: 138 MMHG | DIASTOLIC BLOOD PRESSURE: 80 MMHG | OXYGEN SATURATION: 97 % | BODY MASS INDEX: 31.89 KG/M2 | HEART RATE: 77 BPM | TEMPERATURE: 96.6 F

## 2025-03-07 DIAGNOSIS — F33.1 MAJOR DEPRESSIVE DISORDER, RECURRENT, MODERATE: ICD-10-CM

## 2025-03-07 DIAGNOSIS — M81.0 AGE-RELATED OSTEOPOROSIS WITHOUT CURRENT PATHOLOGICAL FRACTURE: ICD-10-CM

## 2025-03-07 DIAGNOSIS — G25.0 ESSENTIAL TREMOR: ICD-10-CM

## 2025-03-07 DIAGNOSIS — I10 PRIMARY HYPERTENSION: Primary | ICD-10-CM

## 2025-03-07 LAB
ALBUMIN SERPL-MCNC: 4.1 G/DL (ref 3.5–5.2)
ALBUMIN/GLOB SERPL: 1.5 G/DL
ALP SERPL-CCNC: 57 U/L (ref 39–117)
ALT SERPL W P-5'-P-CCNC: 13 U/L (ref 1–33)
ANION GAP SERPL CALCULATED.3IONS-SCNC: 10.1 MMOL/L (ref 5–15)
AST SERPL-CCNC: 26 U/L (ref 1–32)
BILIRUB SERPL-MCNC: 0.6 MG/DL (ref 0–1.2)
BUN SERPL-MCNC: 15 MG/DL (ref 8–23)
BUN/CREAT SERPL: 18.5 (ref 7–25)
CALCIUM SPEC-SCNC: 9.4 MG/DL (ref 8.6–10.5)
CHLORIDE SERPL-SCNC: 103 MMOL/L (ref 98–107)
CO2 SERPL-SCNC: 25.9 MMOL/L (ref 22–29)
CREAT SERPL-MCNC: 0.81 MG/DL (ref 0.57–1)
EGFRCR SERPLBLD CKD-EPI 2021: 75.8 ML/MIN/1.73
GLOBULIN UR ELPH-MCNC: 2.7 GM/DL
GLUCOSE SERPL-MCNC: 81 MG/DL (ref 65–99)
POTASSIUM SERPL-SCNC: 4.4 MMOL/L (ref 3.5–5.2)
PROT SERPL-MCNC: 6.8 G/DL (ref 6–8.5)
SODIUM SERPL-SCNC: 139 MMOL/L (ref 136–145)

## 2025-03-07 PROCEDURE — 80053 COMPREHEN METABOLIC PANEL: CPT | Performed by: FAMILY MEDICINE

## 2025-03-07 NOTE — PROGRESS NOTES
Chief Complaint  Depression    Subjective        Suzette Wilson presents to [facility]  History of Present Illness  The patient presents for evaluation of depression, osteoporosis, and essential tremor.    She reports an initial adverse reaction to her prescribed medication, which resulted in a brief period of illness lasting 1 to 2 days. However, she has since adapted to the medication and is currently tolerating it well. She notes an improvement in her condition, attributing it to the realization of her previous depressive state following the loss of her best friend. This awareness has been beneficial in her recovery process.    She is seeking a refill of her alendronate prescription, which she takes on a weekly basis. She recently underwent a bone density test and has been experiencing discomfort on the left side of her body. She acknowledges a deviation from her usual medication regimen during a recent trip, where she only took her medication once daily, resulting in significant pain. Upon returning home and resuming her regular medication schedule, she has noticed an improvement in her symptoms.    She has observed the onset of tremors, which she suspects may be age-related. These tremors are not severe enough to interfere with her ability to feed herself or drink. She also reports no urinary issues.    Supplemental Information  She reports normal bowel movements, with no instances of constipation or diarrhea.    MEDICATIONS  Current: ibuprofen, tramadol, alendronate        Medical History: has a past medical history of Acid reflux, Anxiety, Depression, Hemorrhoids, Hypertension, Medial meniscus tear (08/11/2016), and Osteoarthritis of left knee (08/24/2016).   Surgical History: has a past surgical history that includes Colonoscopy; Cystocele repair; Facelift; Esophagogastroduodenoscopy (2017); Rectal surgery; Tonsillectomy (1968); and Tubal ligation (1973).   Family History:  "family history includes Alzheimer's disease in her brother and sister; Mental illness in her father; Stroke in her father and mother.   Social History: reports that she has never smoked. She has never been exposed to tobacco smoke. She has never used smokeless tobacco. She reports that she does not currently use alcohol. She reports that she does not use drugs.    There is no immunization history on file for this patient.    Objective   Vital Signs:  /80   Pulse 77   Temp 96.6 °F (35.9 °C)   Ht 160 cm (62.99\")   Wt 81.6 kg (180 lb)   SpO2 97%   BMI 31.89 kg/m²   Estimated body mass index is 31.89 kg/m² as calculated from the following:    Height as of this encounter: 160 cm (62.99\").    Weight as of this encounter: 81.6 kg (180 lb).             ROS:  Review of Systems   Constitutional:  Negative for chills, diaphoresis and fever.   HENT:  Negative for congestion and swollen glands.    Respiratory:  Negative for cough.    Cardiovascular:  Negative for chest pain.   Gastrointestinal:  Negative for nausea and vomiting.   Genitourinary:  Negative for dysuria.   Musculoskeletal:  Negative for neck pain.   Skin:  Negative for rash.   Neurological:  Negative for weakness and numbness.   Psychiatric/Behavioral:  Positive for depressed mood.       Physical Exam  Vitals reviewed.   Constitutional:       Appearance: Normal appearance.   HENT:      Right Ear: Tympanic membrane normal.      Left Ear: Tympanic membrane normal.      Nose: Nose normal.   Eyes:      Extraocular Movements: Extraocular movements intact.      Conjunctiva/sclera: Conjunctivae normal.      Pupils: Pupils are equal, round, and reactive to light.   Cardiovascular:      Rate and Rhythm: Normal rate and regular rhythm.   Pulmonary:      Effort: Pulmonary effort is normal.      Breath sounds: Normal breath sounds.   Abdominal:      General: Bowel sounds are normal.   Musculoskeletal:         General: Normal range of motion.      Cervical back: " Normal range of motion.   Skin:     General: Skin is warm and dry.   Neurological:      General: No focal deficit present.      Mental Status: She is alert and oriented to person, place, and time.   Psychiatric:         Mood and Affect: Mood normal.         Behavior: Behavior normal.       Physical Exam  Lungs are clear.  Heart sounds are normal.      Result Review     The following data was reviewed by: Isis Ordonez MD on 03/07/2025:  Common labs          9/20/2024    09:52 3/7/2025    11:53   Common Labs   Glucose 87  81    BUN 16  15    Creatinine 0.93  0.81    Sodium 136  139    Potassium 4.5  4.4    Chloride 100  103    Calcium 9.3  9.4    Albumin 4.3  4.1    Total Bilirubin 0.5  0.6    Alkaline Phosphatase 52  57    AST (SGOT) 18  26    ALT (SGPT) 10  13      Results  Laboratory Studies  Iron, magnesium, kidney function, blood sugar, liver, B12 and folate were all good.    Imaging  Bone density test showed spine is normal and thigh bone strength increased by 6%. No osteoporosis detected.             Assessment and Plan    Diagnoses and all orders for this visit:    1. Primary hypertension (Primary)    2. Age-related osteoporosis without current pathological fracture    3. Essential tremor    4. Major depressive disorder, recurrent, moderate      Assessment & Plan  1. Depression.  Her depressive symptoms have shown improvement, likely due to the realization of her grieving process. She will continue her current medication regimen to maintain her mental health.    2. Osteoporosis.  Her bone density has improved, with a 6% increase in her thigh bone strength. There is no evidence of osteoporosis at present. She will continue her alendronate therapy to further enhance the strength of her leg and hip bones.    3. Essential tremor.  She has been reassured that her tremors are mild and do not interfere with her daily activities. If the tremors worsen to the point of causing functional problems, further  evaluation will be necessary.    4. Health Maintenance.  Her last cholesterol check was within normal limits. She is up to date on her colorectal cancer screening, with the next one due in 2027. A comprehensive metabolic panel (CMP) will be ordered today to monitor her kidney function. A urine test will also be conducted.    Follow-up  The patient will follow up in 3 months.       I spent 35 minutes caring for Suzette on this date of service. This time includes time spent by me in the following activities:reviewing tests  Follow Up   Return in about 3 months (around 6/7/2025).  Patient was given instructions and counseling regarding her condition or for health maintenance advice. Please see specific information pulled into the AVS if appropriate.   Patient or patient representative verbalized consent for the use of Ambient Listening during the visit with  Isis Ordonez MD for chart documentation. 3/23/2025  11:26 EST    Isis Ordonez MD      Answers submitted by the patient for this visit:  Problem not listed (Submitted on 3/4/2025)  Chief Complaint: Other medical problem  Reason for appointment: followup  anorexia: No  joint pain: No  change in stool: No  vertigo: No  Onset: at an unknown time  Chronicity: recurrent  Frequency: daily  Medications tried: donepezil new medication

## 2025-05-22 DIAGNOSIS — I10 ESSENTIAL HYPERTENSION: ICD-10-CM

## 2025-05-22 RX ORDER — METOPROLOL SUCCINATE 50 MG/1
50 TABLET, EXTENDED RELEASE ORAL DAILY
Qty: 90 TABLET | Refills: 3 | Status: SHIPPED | OUTPATIENT
Start: 2025-05-22 | End: 2026-05-17

## 2025-05-22 NOTE — TELEPHONE ENCOUNTER
Caller: Suzette Wilson    Relationship: Self    Best call back number: [phone]    Requested Prescriptions:   Requested Prescriptions     Pending Prescriptions Disp Refills    metoprolol succinate XL (TOPROL-XL) 50 MG 24 hr tablet 90 tablet 3     Sig: Take 1 tablet by mouth Daily for 360 days.        Pharmacy where request should be sent: [place] [phone] [place] [phone]      Last office visit with prescribing clinician: 3/7/2025   Last telemedicine visit with prescribing clinician: Visit date not found   Next office visit with prescribing clinician: 6/30/2025       Does the patient have less than a 3 day supply:  [] Yes  [x] No    Would you like a call back once the refill request has been completed: [] Yes [x] No    If the office needs to give you a call back, can they leave a voicemail: [] Yes [x] No    Khanh Sanchez Rep   05/22/25 13:40 EDT

## 2025-06-03 DIAGNOSIS — G57.01 SCIATIC NERVE DISEASE, RIGHT: ICD-10-CM

## 2025-06-03 NOTE — TELEPHONE ENCOUNTER
Caller: Katie, Suzette DE JESUS    Relationship: Self    Best call back number: [phone]    Requested Prescriptions:   Requested Prescriptions     Pending Prescriptions Disp Refills    traMADol (ULTRAM) 50 MG tablet 180 tablet 1     Sig: Take 1 tablet by mouth Every 12 (Twelve) Hours As Needed for Moderate Pain for up to 180 days.        Pharmacy where request should be sent: [place] [phone] [place] [phone]      Last office visit with prescribing clinician: 3/7/2025   Last telemedicine visit with prescribing clinician: Visit date not found   Next office visit with prescribing clinician: 6/30/2025     Does the patient have less than a 3 day supply:  [x] Yes  [] No    Would you like a call back once the refill request has been completed: [] Yes [x] No    If the office needs to give you a call back, can they leave a voicemail: [] Yes [x] No    Khanh Sanchez Rep   06/03/25 13:14 EDT

## 2025-06-05 RX ORDER — TRAMADOL HYDROCHLORIDE 50 MG/1
50 TABLET ORAL EVERY 12 HOURS PRN
Qty: 180 TABLET | Refills: 0 | Status: SHIPPED | OUTPATIENT
Start: 2025-06-05

## 2025-06-30 ENCOUNTER — OFFICE VISIT (OUTPATIENT)
Dept: FAMILY MEDICINE CLINIC | Facility: CLINIC | Age: 75
End: 2025-06-30
Payer: MEDICARE

## 2025-06-30 VITALS
HEART RATE: 62 BPM | TEMPERATURE: 98.2 F | WEIGHT: 176 LBS | OXYGEN SATURATION: 98 % | HEIGHT: 63 IN | SYSTOLIC BLOOD PRESSURE: 120 MMHG | DIASTOLIC BLOOD PRESSURE: 70 MMHG | BODY MASS INDEX: 31.18 KG/M2

## 2025-06-30 DIAGNOSIS — Z78.0 OSTEOPENIA AFTER MENOPAUSE: ICD-10-CM

## 2025-06-30 DIAGNOSIS — F01.A0 MILD VASCULAR DEMENTIA WITHOUT BEHAVIORAL DISTURBANCE, PSYCHOTIC DISTURBANCE, MOOD DISTURBANCE, OR ANXIETY: ICD-10-CM

## 2025-06-30 DIAGNOSIS — G57.01 SCIATIC NERVE DISEASE, RIGHT: ICD-10-CM

## 2025-06-30 DIAGNOSIS — I10 PRIMARY HYPERTENSION: Primary | ICD-10-CM

## 2025-06-30 DIAGNOSIS — R45.86 MOOD SWINGS: ICD-10-CM

## 2025-06-30 DIAGNOSIS — F33.8 SEASONAL AFFECTIVE DISORDER: ICD-10-CM

## 2025-06-30 DIAGNOSIS — K21.00 GASTROESOPHAGEAL REFLUX DISEASE WITH ESOPHAGITIS WITHOUT HEMORRHAGE: ICD-10-CM

## 2025-06-30 DIAGNOSIS — I10 ESSENTIAL HYPERTENSION: ICD-10-CM

## 2025-06-30 DIAGNOSIS — M85.80 OSTEOPENIA AFTER MENOPAUSE: ICD-10-CM

## 2025-06-30 PROCEDURE — 3078F DIAST BP <80 MM HG: CPT | Performed by: FAMILY MEDICINE

## 2025-06-30 PROCEDURE — 1126F AMNT PAIN NOTED NONE PRSNT: CPT | Performed by: FAMILY MEDICINE

## 2025-06-30 PROCEDURE — 3074F SYST BP LT 130 MM HG: CPT | Performed by: FAMILY MEDICINE

## 2025-06-30 PROCEDURE — G2211 COMPLEX E/M VISIT ADD ON: HCPCS | Performed by: FAMILY MEDICINE

## 2025-06-30 PROCEDURE — 99214 OFFICE O/P EST MOD 30 MIN: CPT | Performed by: FAMILY MEDICINE

## 2025-06-30 RX ORDER — METOPROLOL SUCCINATE 50 MG/1
50 TABLET, EXTENDED RELEASE ORAL DAILY
Qty: 90 TABLET | Refills: 3 | Status: SHIPPED | OUTPATIENT
Start: 2025-06-30 | End: 2026-06-25

## 2025-06-30 RX ORDER — RABEPRAZOLE SODIUM 20 MG/1
20 TABLET, DELAYED RELEASE ORAL DAILY
Qty: 90 TABLET | Refills: 1 | Status: SHIPPED | OUTPATIENT
Start: 2025-06-30

## 2025-06-30 RX ORDER — BUPROPION HYDROCHLORIDE 150 MG/1
150 TABLET ORAL DAILY
Qty: 90 TABLET | Refills: 3 | Status: SHIPPED | OUTPATIENT
Start: 2025-06-30 | End: 2025-12-27

## 2025-06-30 RX ORDER — ALENDRONATE SODIUM 35 MG/1
35 TABLET ORAL
Qty: 12 TABLET | Refills: 3 | Status: SHIPPED | OUTPATIENT
Start: 2025-06-30 | End: 2026-06-01

## 2025-06-30 RX ORDER — DONEPEZIL HYDROCHLORIDE 5 MG/1
5 TABLET, FILM COATED ORAL NIGHTLY
Qty: 90 TABLET | Refills: 1 | Status: SHIPPED | OUTPATIENT
Start: 2025-06-30 | End: 2025-09-28

## 2025-06-30 RX ORDER — LISINOPRIL 20 MG/1
20 TABLET ORAL DAILY
Qty: 90 TABLET | Refills: 3 | Status: SHIPPED | OUTPATIENT
Start: 2025-06-30 | End: 2026-06-25

## 2025-06-30 RX ORDER — TRAMADOL HYDROCHLORIDE 50 MG/1
50 TABLET ORAL EVERY 12 HOURS PRN
Qty: 180 TABLET | Refills: 0 | Status: SHIPPED | OUTPATIENT
Start: 2025-06-30

## 2025-06-30 NOTE — PROGRESS NOTES
"Chief Complaint  Hypertension, Knocked glass out of cupboard and hit nose, and Med Refill    Subjective        Suzette Wilson presents to [facility]  History of Present Illness  The patient is a 74-year-old female who presents for evaluation of osteopenia, arthritis, and memory issues.    She reports an overall improvement in her health status. Her arthritis symptoms have been well-managed, with no current complaints. She is currently on tramadol for pain management.    She has been taking Aricept for memory enhancement and notes a significant improvement in her cognitive function.    She is seeking refills for all her medications.        Medical History: has a past medical history of Acid reflux, Anxiety, Depression, Hemorrhoids, Hypertension, Medial meniscus tear (08/11/2016), and Osteoarthritis of left knee (08/24/2016).   Surgical History: has a past surgical history that includes Colonoscopy; Cystocele repair; Facelift; Esophagogastroduodenoscopy (2017); Rectal surgery; Tonsillectomy (1968); and Tubal ligation (1973).   Family History: family history includes Alzheimer's disease in her brother and sister; Mental illness in her father; Stroke in her father and mother.   Social History: reports that she has never smoked. She has never been exposed to tobacco smoke. She has never used smokeless tobacco. She reports that she does not currently use alcohol. She reports that she does not use drugs.    There is no immunization history on file for this patient.    Objective   Vital Signs:  /70 (BP Location: Left arm)   Pulse 62   Temp 98.2 °F (36.8 °C)   Ht 160 cm (63\")   Wt 79.8 kg (176 lb)   SpO2 98%   BMI 31.18 kg/m²   Estimated body mass index is 31.18 kg/m² as calculated from the following:    Height as of this encounter: 160 cm (63\").    Weight as of this encounter: 79.8 kg (176 lb).             ROS:  Review of Systems   Constitutional:  Negative for fatigue and " fever.   HENT:  Negative for congestion, ear pain and sinus pressure.    Respiratory:  Negative for cough, chest tightness and shortness of breath.    Cardiovascular:  Negative for chest pain, palpitations and leg swelling.   Gastrointestinal:  Negative for abdominal pain and diarrhea.   Genitourinary:  Negative for dysuria and frequency.   Neurological:  Negative for speech difficulty, headache and confusion.   Psychiatric/Behavioral:  Negative for agitation and behavioral problems.       Physical Exam  Vitals reviewed.   Constitutional:       Appearance: Normal appearance.   HENT:      Right Ear: Tympanic membrane normal.      Left Ear: Tympanic membrane normal.      Nose: Nose normal.   Eyes:      Extraocular Movements: Extraocular movements intact.      Conjunctiva/sclera: Conjunctivae normal.      Pupils: Pupils are equal, round, and reactive to light.   Cardiovascular:      Rate and Rhythm: Normal rate and regular rhythm.   Pulmonary:      Effort: Pulmonary effort is normal.      Breath sounds: Normal breath sounds.   Abdominal:      General: Bowel sounds are normal.   Musculoskeletal:         General: Normal range of motion.      Cervical back: Normal range of motion.   Skin:     General: Skin is warm and dry.   Neurological:      General: No focal deficit present.      Mental Status: She is alert and oriented to person, place, and time.   Psychiatric:         Mood and Affect: Mood normal.         Behavior: Behavior normal.       Physical Exam  Head: Bruise noted on the nose.  Respiratory: Clear to auscultation, no wheezing, rales or rhonchi  Extremities: No swelling in the ankles.      Result Review   The following data was reviewed by: Isis Ordonez MD on 06/30/2025:  Common labs          9/20/2024    09:52 3/7/2025    11:53   Common Labs   Glucose 87  81    BUN 16  15    Creatinine 0.93  0.81    Sodium 136  139    Potassium 4.5  4.4    Chloride 100  103    Calcium 9.3  9.4    Albumin 4.3  4.1    Total  Bilirubin 0.5  0.6    Alkaline Phosphatase 52  57    AST (SGOT) 18  26    ALT (SGPT) 10  13      Results  Labs   - General Labs: Normal    Imaging   - Bone density x-ray of the back: Earlier this year, Increase in bone density by 7.5%   - Bone density x-ray of the hip: Earlier this year, Increase in bone density by 6%             Assessment and Plan     Diagnoses and all orders for this visit:    1. Primary hypertension (Primary)    2. Sciatic nerve disease, right  -     traMADol (ULTRAM) 50 MG tablet; Take 1 tablet by mouth Every 12 (Twelve) Hours As Needed for Moderate Pain.  Dispense: 180 tablet; Refill: 0    3. Gastroesophageal reflux disease with esophagitis without hemorrhage  -     RABEprazole (ACIPHEX) 20 MG EC tablet; Take 1 tablet by mouth Daily.  Dispense: 90 tablet; Refill: 1    4. Essential hypertension  -     metoprolol succinate XL (TOPROL-XL) 50 MG 24 hr tablet; Take 1 tablet by mouth Daily for 360 days.  Dispense: 90 tablet; Refill: 3  -     lisinopril (PRINIVIL,ZESTRIL) 20 MG tablet; Take 1 tablet by mouth Daily for 360 days.  Dispense: 90 tablet; Refill: 3    5. Mild vascular dementia without behavioral disturbance, psychotic disturbance, mood disturbance, or anxiety  -     donepezil (Aricept) 5 MG tablet; Take 1 tablet by mouth Every Night for 90 days.  Dispense: 90 tablet; Refill: 1    6. Mood swings  -     buPROPion XL (Wellbutrin XL) 150 MG 24 hr tablet; Take 1 tablet by mouth Daily for 180 days.  Dispense: 90 tablet; Refill: 3    7. Seasonal affective disorder  -     buPROPion XL (Wellbutrin XL) 150 MG 24 hr tablet; Take 1 tablet by mouth Daily for 180 days.  Dispense: 90 tablet; Refill: 3    8. Osteopenia after menopause  -     alendronate (FOSAMAX) 35 MG tablet; Take 1 tablet by mouth Every 7 (Seven) Days for 336 days.  Dispense: 12 tablet; Refill: 3      Assessment & Plan  1. Osteopenia.  - Bone density has shown significant improvement, with a 7.5% increase in spinal bone density and a 6%  increase in hip bone density.  - The spine is now within normal range, while the hip remains osteopenic.  - Advised to continue regimen of calcium, vitamin D, and alendronate.  - Repeat bone density scan will be scheduled for 2027; discontinue alendronate only if dental procedures involving the bone are planned.    2. Arthritis.  - Reports that arthritis is currently well-managed and not causing any discomfort.  - Physical exam findings indicate no swelling in the ankles.  - Currently taking tramadol for arthritis pain.  - Continue current medication regimen.    3. Memory issues.  - Currently taking Aricept for memory enhancement.  - Reports feeling better and more alert.  - Continue current medication regimen.    4. Health maintenance.  - Blood pressure readings are within normal range.  - Experienced a weight loss of approximately 4 pounds.  - Due for a mammogram and encouraged to stay on top of this screening.  - Cholesterol check will be conducted before next appointment on 11/13/2025.  - All necessary medication refills have been provided and sent to pharmacy.    Follow-up  The patient will follow up on 11/13/2025.       I spent 35 minutes caring for Suzette on this date of service. This time includes time spent by me in the following activities:reviewing tests  Follow Up  Return in about 3 months (around 9/30/2025).  Patient was given instructions and counseling regarding her condition or for health maintenance advice. Please see specific information pulled into the AVS if appropriate.   Patient or patient representative verbalized consent for the use of Ambient Listening during the visit with  Isis Ordonez MD for chart documentation. 7/14/2025  08:49 EDT    Isis Ordonez MD

## 2025-07-07 RX ORDER — LANOLIN ALCOHOL/MO/W.PET/CERES
1000 CREAM (GRAM) TOPICAL DAILY
Qty: 90 TABLET | Refills: 3 | Status: SHIPPED | OUTPATIENT
Start: 2025-07-07

## 2025-07-07 NOTE — TELEPHONE ENCOUNTER
Caller: Suzette Wilson    Relationship: Self    Best call back number: [phone]     Requested Prescriptions:   Requested Prescriptions     Pending Prescriptions Disp Refills    vitamin B-12 (CYANOCOBALAMIN) 1000 MCG tablet          Pharmacy where request should be sent: [facility] [place] [phone] [place] [phone]      Last office visit with prescribing clinician: 6/30/2025   Last telemedicine visit with prescribing clinician: Visit date not found   Next office visit with prescribing clinician: 11/13/2025       Does the patient have less than a 3 day supply:  [] Yes  [x] No    Would you like a call back once the refill request has been completed: [] Yes [x] No    If the office needs to give you a call back, can they leave a voicemail: [] Yes [x] No    Khanh Styles Rep   07/07/25 15:09 EDT

## 2025-08-25 DIAGNOSIS — I10 ESSENTIAL HYPERTENSION: ICD-10-CM

## 2025-08-25 RX ORDER — METOPROLOL SUCCINATE 50 MG/1
50 TABLET, EXTENDED RELEASE ORAL DAILY
Qty: 90 TABLET | Refills: 3 | Status: SHIPPED | OUTPATIENT
Start: 2025-08-25 | End: 2026-08-20